# Patient Record
Sex: FEMALE | Race: WHITE | URBAN - METROPOLITAN AREA
[De-identification: names, ages, dates, MRNs, and addresses within clinical notes are randomized per-mention and may not be internally consistent; named-entity substitution may affect disease eponyms.]

---

## 2022-08-24 ENCOUNTER — OFFICE VISIT (OUTPATIENT)
Dept: FAMILY MEDICINE CLINIC | Age: 27
End: 2022-08-24
Payer: COMMERCIAL

## 2022-08-24 VITALS
DIASTOLIC BLOOD PRESSURE: 72 MMHG | SYSTOLIC BLOOD PRESSURE: 112 MMHG | HEIGHT: 65 IN | OXYGEN SATURATION: 97 % | HEART RATE: 97 BPM | TEMPERATURE: 97.8 F | WEIGHT: 235 LBS | BODY MASS INDEX: 39.15 KG/M2

## 2022-08-24 DIAGNOSIS — N92.1 MENORRHAGIA WITH IRREGULAR CYCLE: ICD-10-CM

## 2022-08-24 DIAGNOSIS — F41.9 ANXIETY AND DEPRESSION: ICD-10-CM

## 2022-08-24 DIAGNOSIS — M89.8X1 PAIN OF RIGHT CLAVICLE: ICD-10-CM

## 2022-08-24 DIAGNOSIS — F32.A ANXIETY AND DEPRESSION: ICD-10-CM

## 2022-08-24 DIAGNOSIS — Z76.89 ENCOUNTER TO ESTABLISH CARE: Primary | ICD-10-CM

## 2022-08-24 DIAGNOSIS — M54.2 NECK PAIN: ICD-10-CM

## 2022-08-24 LAB
ANISOCYTOSIS: ABNORMAL
BASOPHILS ABSOLUTE: 0.1 K/UL (ref 0–0.2)
BASOPHILS RELATIVE PERCENT: 1 %
EOSINOPHILS ABSOLUTE: 0.4 K/UL (ref 0–0.7)
EOSINOPHILS RELATIVE PERCENT: 5 %
HCT VFR BLD CALC: 31.8 % (ref 37–47)
HEMOGLOBIN: 10 G/DL (ref 12–16)
HYPOCHROMIA: ABNORMAL
LYMPHOCYTES ABSOLUTE: 2.2 K/UL (ref 1–4.8)
LYMPHOCYTES RELATIVE PERCENT: 30 %
MCH RBC QN AUTO: 22.5 PG (ref 27–31.3)
MCHC RBC AUTO-ENTMCNC: 31.6 % (ref 33–37)
MCV RBC AUTO: 71.1 FL (ref 82–100)
MICROCYTES: ABNORMAL
MONOCYTES ABSOLUTE: 0.3 K/UL (ref 0.2–0.8)
MONOCYTES RELATIVE PERCENT: 3.9 %
NEUTROPHILS ABSOLUTE: 4.4 K/UL (ref 1.4–6.5)
NEUTROPHILS RELATIVE PERCENT: 60 %
PDW BLD-RTO: 17.9 % (ref 11.5–14.5)
PLATELET # BLD: 264 K/UL (ref 130–400)
PLATELET SLIDE REVIEW: NORMAL
POLYCHROMASIA: ABNORMAL
RBC # BLD: 4.47 M/UL (ref 4.2–5.4)
TSH REFLEX: 1.41 UIU/ML (ref 0.44–3.86)
WBC # BLD: 7.3 K/UL (ref 4.8–10.8)

## 2022-08-24 PROCEDURE — 99204 OFFICE O/P NEW MOD 45 MIN: CPT | Performed by: STUDENT IN AN ORGANIZED HEALTH CARE EDUCATION/TRAINING PROGRAM

## 2022-08-24 RX ORDER — ETONOGESTREL 68 MG/1
68 IMPLANT SUBCUTANEOUS ONCE
Qty: 1 EACH | Refills: 0 | Status: SHIPPED | OUTPATIENT
Start: 2022-08-24 | End: 2022-11-04

## 2022-08-24 RX ORDER — HYDROXYZINE PAMOATE 25 MG/1
25 CAPSULE ORAL 3 TIMES DAILY PRN
Qty: 90 CAPSULE | Refills: 0 | Status: SHIPPED | OUTPATIENT
Start: 2022-08-24 | End: 2022-09-19

## 2022-08-24 RX ORDER — FLUOXETINE HYDROCHLORIDE 20 MG/1
20 CAPSULE ORAL DAILY
Qty: 30 CAPSULE | Refills: 1 | Status: SHIPPED | OUTPATIENT
Start: 2022-08-24 | End: 2022-09-21 | Stop reason: DRUGHIGH

## 2022-08-24 SDOH — ECONOMIC STABILITY: FOOD INSECURITY: WITHIN THE PAST 12 MONTHS, THE FOOD YOU BOUGHT JUST DIDN'T LAST AND YOU DIDN'T HAVE MONEY TO GET MORE.: NEVER TRUE

## 2022-08-24 SDOH — ECONOMIC STABILITY: FOOD INSECURITY: WITHIN THE PAST 12 MONTHS, YOU WORRIED THAT YOUR FOOD WOULD RUN OUT BEFORE YOU GOT MONEY TO BUY MORE.: NEVER TRUE

## 2022-08-24 SDOH — ECONOMIC STABILITY: TRANSPORTATION INSECURITY
IN THE PAST 12 MONTHS, HAS LACK OF TRANSPORTATION KEPT YOU FROM MEETINGS, WORK, OR FROM GETTING THINGS NEEDED FOR DAILY LIVING?: NO

## 2022-08-24 SDOH — ECONOMIC STABILITY: TRANSPORTATION INSECURITY
IN THE PAST 12 MONTHS, HAS THE LACK OF TRANSPORTATION KEPT YOU FROM MEDICAL APPOINTMENTS OR FROM GETTING MEDICATIONS?: NO

## 2022-08-24 ASSESSMENT — PATIENT HEALTH QUESTIONNAIRE - PHQ9
SUM OF ALL RESPONSES TO PHQ9 QUESTIONS 1 & 2: 4
SUM OF ALL RESPONSES TO PHQ QUESTIONS 1-9: 15
9. THOUGHTS THAT YOU WOULD BE BETTER OFF DEAD, OR OF HURTING YOURSELF: 0
8. MOVING OR SPEAKING SO SLOWLY THAT OTHER PEOPLE COULD HAVE NOTICED. OR THE OPPOSITE, BEING SO FIGETY OR RESTLESS THAT YOU HAVE BEEN MOVING AROUND A LOT MORE THAN USUAL: 1
2. FEELING DOWN, DEPRESSED OR HOPELESS: 2
1. LITTLE INTEREST OR PLEASURE IN DOING THINGS: 2
6. FEELING BAD ABOUT YOURSELF - OR THAT YOU ARE A FAILURE OR HAVE LET YOURSELF OR YOUR FAMILY DOWN: 2
4. FEELING TIRED OR HAVING LITTLE ENERGY: 2
5. POOR APPETITE OR OVEREATING: 3
SUM OF ALL RESPONSES TO PHQ QUESTIONS 1-9: 15
10. IF YOU CHECKED OFF ANY PROBLEMS, HOW DIFFICULT HAVE THESE PROBLEMS MADE IT FOR YOU TO DO YOUR WORK, TAKE CARE OF THINGS AT HOME, OR GET ALONG WITH OTHER PEOPLE: 2
SUM OF ALL RESPONSES TO PHQ QUESTIONS 1-9: 15
SUM OF ALL RESPONSES TO PHQ QUESTIONS 1-9: 15
3. TROUBLE FALLING OR STAYING ASLEEP: 3
7. TROUBLE CONCENTRATING ON THINGS, SUCH AS READING THE NEWSPAPER OR WATCHING TELEVISION: 0

## 2022-08-24 ASSESSMENT — ENCOUNTER SYMPTOMS
NAUSEA: 0
RHINORRHEA: 0
DIARRHEA: 0
SORE THROAT: 0
SHORTNESS OF BREATH: 0
VOMITING: 0
ABDOMINAL PAIN: 0
WHEEZING: 0
COUGH: 0
EYE DISCHARGE: 0

## 2022-08-24 ASSESSMENT — SOCIAL DETERMINANTS OF HEALTH (SDOH): HOW HARD IS IT FOR YOU TO PAY FOR THE VERY BASICS LIKE FOOD, HOUSING, MEDICAL CARE, AND HEATING?: NOT HARD AT ALL

## 2022-08-24 NOTE — PROGRESS NOTES
Pato Moreno [address] 33 y.o. female presents today with:  Chief Complaint   Patient presents with    Establish Care     Was being seen by Dr. Holly Park prior. Pain     Has been having right side collarbone pain that goes into the neck & chest area for about a year now. Over the past 2 months it has increased & is having the pain everyday, all day. Denies any injury to the area. Has used ice to the area & OTC pain relievers with no relief. Menorrhagia     Has been having long & irregular periods for the past year. States she didn't have a period for about 6 mo. Then had a period that lasted a month. Since that it has been on & off, missing & lasting for about a month. States this last period lasted for 2 months. HPI    Patient with appointment to establish care. She has not had a PCP in a while. Patient with menorrhagia with regular cycle. She states that over the last year or so her cycles have been irregular. She states that they are wanting heavy. She states it started in December 2021. She skips for 1 to 2 months and then she will have her cycle for 1 to 2 months. She states that she had a cycle in June of last 1 month when she had a week off and then had another month of bleeding. She is interested in using hormones to help control her cycle. Patient also with pain of the right clavicle. She states she has been having pain in the area for about a year. She has not had any imaging done. She states he also gets into the neck. She states the last 2 months have made it worse. 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 She states this does make the pain worse. She denies any injury or trauma. She is having pain every day. She has used ice to the area. She has tried over-the-counter pain relievers that have not helped much. Nothing else seems to make the pain better or worse. Patient also with anxiety and depression.   She states she has had depression since she has very little. She was previously on fluoxetine. She states that this did work well. She states that she was stressed last winter and so she was off the medicine for a time and then never went back on it. She is interested in restarting today. She denies any suicidal or homicidal ideation. She states that she also has anxiety and occasional panic. She states that it does impact her daily life. She has no other concerns at this time. PHQ-2 Over the past 2 weeks, how often have you been bothered by any of the following problems? Little interest or pleasure in doing things: More than half the days  Feeling down, depressed, or hopeless: More than half the days  PHQ-2 Score: 4  PHQ-9 Over the past 2 weeks, how often have you been bothered by any of the following problems? Trouble falling or staying asleep, or sleeping too much: Nearly every day  Feeling tired or having little energy: More than half the days  Poor appetite or overeating: Nearly every day  Feeling bad about yourself - or that you are a failure or have let yourself or your family down: More than half the days  Trouble concentrating on things, such as reading the newspaper or watching television: Not at all  Moving or speaking so slowly that other people could have noticed.  Or the opposite - being so fidgety or restless that you have been moving around a lot more than usual: Several days  Thoughts that you would be better off dead, or of hurting yourself in some way: Not at all  If you checked off any problems, how difficult have these problems made it for you to do your work, take care of things at home, or get along with other people?: Very difficult  PHQ-9 Total Score: 15  PHQ-9 Total Score: 15    On the basis of positive PHQ-9 screening (PHQ-9 Total Score: 15), the following plan was implemented: additional evaluation and assessment performed, follow-up plan includes but not limited to: Medication Conjunctivae normal.   Neck:      Thyroid: No thyromegaly or thyroid tenderness. Cardiovascular:      Rate and Rhythm: Normal rate and regular rhythm. Heart sounds: No murmur heard. No friction rub. No gallop. Pulmonary:      Effort: Pulmonary effort is normal.      Breath sounds: Normal breath sounds. No wheezing, rhonchi or rales. Musculoskeletal:         General: No swelling or deformity. Cervical back: Normal range of motion and neck supple. Right lower leg: No edema. Left lower leg: No edema. Lymphadenopathy:      Cervical: No cervical adenopathy. Skin:     General: Skin is warm and dry. Findings: No rash. Neurological:      General: No focal deficit present. Mental Status: She is alert. Gait: Gait is intact. Psychiatric:         Mood and Affect: Mood normal.         Behavior: Behavior normal.       Assessment & Plan       1. Encounter to establish care  Patient appointment to establish care. She has not had a PCP in quite some time. 2. Menorrhagia with irregular cycle  With menorrhagia with regular cycle. Will obtain labs including CBC, iron and TSH. Call with results. She is interested in using Nexplanon to help regulate her cycle. This will be ordered and she will be scheduled once the device is in. We discussed risks and benefits and she would like to proceed. All questions answered. Follow-up as scheduled. - CBC with Auto Differential; Future  - Iron and TIBC; Future  - TSH with Reflex; Future  - etonogestrel (NEXPLANON) 68 MG implant; 68 mg by Subdermal route once for 1 dose  Dispense: 1 each; Refill: 0    3. Pain of right clavicle  Patient with pain of the right clavicle and neck pain. Will obtain x-ray imaging. Call with results when return. She is also interested in physical therapy. Referral placed. Follow-up as scheduled. Continue with acetaminophen and ibuprofen as needed. Continue to monitor. Follow-up as scheduled.   - XR SHOULDER RIGHT (MIN 2 VIEWS); Future  - XR CERVICAL SPINE (4-5 VIEWS); Future  - [facility] [place]    4. Neck pain  As above  - XR CERVICAL SPINE (4-5 VIEWS); Future  - [facility] [place]    5. Anxiety and depression  Patient with anxiety and depression. He is not currently controlled. She was previously on fluoxetine. We will plan to restart at 20 mg.  Prescription sent to the pharmacy. We will also add hydroxyzine to current regimen. We discussed risks and benefits and she would like to proceed. If suicidal or homicidal ideation develop, seek care in emergency room right away. Follow-up in 4 weeks. All questions answered. - FLUoxetine (PROZAC) 20 MG capsule; Take 1 capsule by mouth daily  Dispense: 30 capsule; Refill: 1  - hydrOXYzine pamoate (VISTARIL) 25 MG capsule; Take 1 capsule by mouth 3 times daily as needed for Anxiety  Dispense: 90 capsule;  Refill: 0  Orders Placed This Encounter   Procedures    XR SHOULDER RIGHT (MIN 2 VIEWS)     Standing Status:   Future     Number of Occurrences:   1     Standing Expiration Date:   8/24/2023     Order Specific Question:   Reason for exam:     Answer:   right clavicular pain    XR CERVICAL SPINE (4-5 VIEWS)     Standing Status:   Future     Number of Occurrences:   1     Standing Expiration Date:   8/24/2023    CBC with Auto Differential     Standing Status:   Future     Number of Occurrences:   1     Standing Expiration Date:   8/24/2023    Iron and TIBC     Standing Status:   Future     Number of Occurrences:   1     Standing Expiration Date:   8/24/2023    TSH with Reflex     Standing Status:   Future     Number of Occurrences:   1     Standing Expiration Date:   8/24/2023    [facility] [place]     Referral Priority:   Routine     Referral Type:   Eval and Treat     Referral Reason:   Specialty Services Required     Requested Specialty:   Physical Therapist     Number of Visits Requested:   1 Orders Placed This Encounter   Medications    etonogestrel (NEXPLANON) 68 MG implant     Si mg by Subdermal route once for 1 dose     Dispense:  1 each     Refill:  0    FLUoxetine (PROZAC) 20 MG capsule     Sig: Take 1 capsule by mouth daily     Dispense:  30 capsule     Refill:  1    hydrOXYzine pamoate (VISTARIL) 25 MG capsule     Sig: Take 1 capsule by mouth 3 times daily as needed for Anxiety     Dispense:  90 capsule     Refill:  0       There are no discontinued medications. Return in about 4 weeks (around 2022) for follow up. Reviewed with the patient: current clinical status,medications, activities and diet. 45 minutes spent talking with patient and reviewing chart. Side effects, adverse effects of themedication prescribed today, as well as treatment plan/ rationale and result expectations have been discussed with the patient who expresses understanding and desires to proceed. Close follow up to evaluate treatment results and for coordination of care. I have reviewed the patient's medical history in detail and updated the computerized patient record. Please note, this report has been partially produced using speech recognition software and may cause  and /or contain errors related to that system including grammar, punctuation and spelling as well as words and phrases that may seem inappropriate. If there are questions or concerns please feel free to contact me to clarify.     Harsh Torrez, DO

## 2022-08-25 ENCOUNTER — TELEPHONE (OUTPATIENT)
Dept: FAMILY MEDICINE CLINIC | Age: 27
End: 2022-08-25

## 2022-08-25 LAB
IRON SATURATION: 6 % (ref 20–55)
IRON: 22 UG/DL (ref 37–145)
TOTAL IRON BINDING CAPACITY: 359 UG/DL (ref 250–450)
UNSATURATED IRON BINDING CAPACITY: 337 UG/DL (ref 112–347)

## 2022-08-25 NOTE — RESULT ENCOUNTER NOTE
Please inform patient that her thyroid testing was normal.  Her CBC does show anemia. This is likely iron deficient anemia from her heavy menstrual cycles. Recommend starting over-the-counter salt elemental iron. Recommend taking it 3 times weekly to help prevent constipation. We will plan to recheck at her follow-up appointment.   Thanks

## 2022-08-29 ENCOUNTER — TELEPHONE (OUTPATIENT)
Dept: FAMILY MEDICINE CLINIC | Age: 27
End: 2022-08-29

## 2022-09-19 DIAGNOSIS — F41.9 ANXIETY AND DEPRESSION: ICD-10-CM

## 2022-09-19 DIAGNOSIS — F32.A ANXIETY AND DEPRESSION: ICD-10-CM

## 2022-09-19 RX ORDER — HYDROXYZINE PAMOATE 25 MG/1
CAPSULE ORAL
Qty: 90 CAPSULE | Refills: 0 | Status: SHIPPED | OUTPATIENT
Start: 2022-09-19

## 2022-09-19 NOTE — TELEPHONE ENCOUNTER
Requested Prescriptions     Pending Prescriptions Disp Refills    hydrOXYzine pamoate (VISTARIL) 25 MG capsule [Pharmacy Med Name: hydrOXYzine Pamoate 25 MG Oral Capsule] 90 capsule 0     Sig: TAKE 1 CAPSULE BY MOUTH THREE TIMES DAILY AS NEEDED FOR ANXIETY

## 2022-09-21 ENCOUNTER — OFFICE VISIT (OUTPATIENT)
Dept: FAMILY MEDICINE CLINIC | Age: 27
End: 2022-09-21
Payer: COMMERCIAL

## 2022-09-21 VITALS
TEMPERATURE: 98.2 F | HEIGHT: 65 IN | WEIGHT: 236 LBS | DIASTOLIC BLOOD PRESSURE: 72 MMHG | HEART RATE: 81 BPM | SYSTOLIC BLOOD PRESSURE: 110 MMHG | OXYGEN SATURATION: 99 % | BODY MASS INDEX: 39.32 KG/M2

## 2022-09-21 DIAGNOSIS — M89.8X1 PAIN OF RIGHT CLAVICLE: ICD-10-CM

## 2022-09-21 DIAGNOSIS — F41.9 ANXIETY AND DEPRESSION: Primary | ICD-10-CM

## 2022-09-21 DIAGNOSIS — F32.A ANXIETY AND DEPRESSION: Primary | ICD-10-CM

## 2022-09-21 PROCEDURE — 99214 OFFICE O/P EST MOD 30 MIN: CPT | Performed by: STUDENT IN AN ORGANIZED HEALTH CARE EDUCATION/TRAINING PROGRAM

## 2022-09-21 RX ORDER — FLUOXETINE HYDROCHLORIDE 40 MG/1
40 CAPSULE ORAL DAILY
Qty: 30 CAPSULE | Refills: 1 | Status: SHIPPED | OUTPATIENT
Start: 2022-09-21 | End: 2022-10-19 | Stop reason: SDUPTHER

## 2022-09-21 ASSESSMENT — ENCOUNTER SYMPTOMS
ABDOMINAL PAIN: 0
WHEEZING: 0
RHINORRHEA: 0
SORE THROAT: 0
SHORTNESS OF BREATH: 0
DIARRHEA: 0
NAUSEA: 0
VOMITING: 0
COUGH: 0
EYE DISCHARGE: 0

## 2022-09-21 NOTE — PROGRESS NOTES
Pato Moreno 39., 33 y.o. female presents today with:  Chief Complaint   Patient presents with    Anxiety     Anxiety is pretty stable at this time. Has only needed to take the Hydroxyzine a couple of times. Feels the medication did help when she took it. Depression     Feels her mood has improved slightly. Feels like it could improve more. Follow-up    Arm Pain     States neck/arm pain continues, but has a less physical job, so its manageable right now. States her insurance is only good through the rest of this month so PT suggested she wait to schedule PT until after her new insurance begins. HPI    Patient with follow-up appointment for anxiety, depression and neck and arm pain. Patient with anxiety and depression. She is currently on fluoxetine 20 mg daily. She states that anxiety definitely seems to be doing better. Depression has improved a little bit. She thinks that it could be a little bit better. She is wanting to increase the dose. She states that hydroxyzine has helped her anxiety symptoms. She states she is only needed to take it a couple times and that it feels like overall her symptoms are improving. She has not had any side effects from either medications. No suicidal or homicidal ideation. Patient also with continued neck and arm pain. She has not yet been able to go to physical therapy. She is planning to start once her new insurance kicks in. She states it is manageable at this point. She did have a job change and that seems to be helping. She states that it still does bother her from time to time. She has no other concerns at this time. Review of Systems   Constitutional:  Negative for chills, fatigue, fever and unexpected weight change. HENT:  Negative for congestion, rhinorrhea and sore throat. Eyes:  Negative for discharge. Respiratory:  Negative for cough, shortness of breath and wheezing.     Cardiovascular:  Negative for chest pain, palpitations and leg swelling. Gastrointestinal:  Negative for abdominal pain, diarrhea, nausea and vomiting. Genitourinary:  Negative for difficulty urinating. Musculoskeletal:  Positive for arthralgias. Negative for joint swelling. Skin:  Negative for rash. Neurological:  Negative for weakness, light-headedness, numbness and headaches. Psychiatric/Behavioral:  Negative for confusion and suicidal ideas. The patient is nervous/anxious (Improved). Past Medical History:   Diagnosis Date    ADHD (attention deficit hyperactivity disorder)     Anxiety     Asthma     Depression      Past Surgical History:   Procedure Laterality Date    CYST REMOVAL      front of neck at age of 12     Current Outpatient Medications   Medication Sig Dispense Refill    FLUoxetine (PROZAC) 40 MG capsule Take 1 capsule by mouth daily 30 capsule 1    hydrOXYzine pamoate (VISTARIL) 25 MG capsule TAKE 1 CAPSULE BY MOUTH THREE TIMES DAILY AS NEEDED FOR ANXIETY 90 capsule 0    etonogestrel (NEXPLANON) 68 MG implant 68 mg by Subdermal route once for 1 dose 1 each 0     No current facility-administered medications for this visit. PMH, Surgical Hx, Family Hx, and Social Hx reviewed and updated. Health Maintenance reviewed. Objective    Vitals:    09/21/22 1109   BP: 110/72   Pulse: 81   Temp: 98.2 °F (36.8 °C)   SpO2: 99%   Weight: 236 lb (107 kg)   Height: 5' 5\" (1.651 m)       Physical Exam  Vitals reviewed. Constitutional:       General: She is not in acute distress. HENT:      Head: Normocephalic and atraumatic. Eyes:      Conjunctiva/sclera: Conjunctivae normal.   Neck:      Thyroid: No thyromegaly or thyroid tenderness. Cardiovascular:      Rate and Rhythm: Normal rate and regular rhythm. Heart sounds: No murmur heard. No friction rub. No gallop. Pulmonary:      Effort: Pulmonary effort is normal.      Breath sounds: Normal breath sounds. No wheezing, rhonchi or rales.    Musculoskeletal:         General: No swelling or deformity. Cervical back: Normal range of motion and neck supple. Right lower leg: No edema. Left lower leg: No edema. Lymphadenopathy:      Cervical: No cervical adenopathy. Skin:     General: Skin is warm and dry. Findings: No rash. Neurological:      General: No focal deficit present. Mental Status: She is alert. Gait: Gait is intact. Psychiatric:         Mood and Affect: Mood normal.         Behavior: Behavior normal.          Assessment & Plan       1. Anxiety and depression  Not completely controlled. Will increase dose of fluoxetine from 20 mg daily to 40 mg daily. New prescription sent to the pharmacy. Continue hydroxyzine as needed. Continue to monitor. Follow-up in 4 weeks. Increase dose of medication at that time. Patient voiced understanding. All questions answered. Follow-up as scheduled. - FLUoxetine (PROZAC) 40 MG capsule; Take 1 capsule by mouth daily  Dispense: 30 capsule; Refill: 1    2. Pain of right clavicle  Somewhat better. Left job is less physical.  She will follow-up with physical therapy once her new insurance is kicked in. Continue to monitor. Follow-up as scheduled. No orders of the defined types were placed in this encounter. Orders Placed This Encounter   Medications    FLUoxetine (PROZAC) 40 MG capsule     Sig: Take 1 capsule by mouth daily     Dispense:  30 capsule     Refill:  1     Medications Discontinued During This Encounter   Medication Reason    FLUoxetine (PROZAC) 20 MG capsule DOSE ADJUSTMENT     Return in about 4 weeks (around 10/19/2022) for follow up. Reviewed with the patient: current clinical status,medications, activities and diet. Side effects, adverse effects of themedication prescribed today, as well as treatment plan/ rationale and result expectations have been discussed with the patient who expresses understanding and desires to proceed.     Close follow up to evaluate treatment results and for coordination of care. I have reviewed the patient's medical history in detail and updated the computerized patient record. Please note, this report has been partially produced using speech recognition software and may cause  and /or contain errors related to that system including grammar, punctuation and spelling as well as words and phrases that may seem inappropriate. If there are questions or concerns please feel free to contact me to clarify.     Hailee Morris, DO

## 2022-09-27 ENCOUNTER — PROCEDURE VISIT (OUTPATIENT)
Dept: FAMILY MEDICINE CLINIC | Age: 27
End: 2022-09-27
Payer: COMMERCIAL

## 2022-09-27 VITALS
HEIGHT: 65 IN | BODY MASS INDEX: 38.92 KG/M2 | TEMPERATURE: 98.4 F | OXYGEN SATURATION: 97 % | HEART RATE: 82 BPM | WEIGHT: 233.6 LBS | DIASTOLIC BLOOD PRESSURE: 72 MMHG | SYSTOLIC BLOOD PRESSURE: 133 MMHG

## 2022-09-27 DIAGNOSIS — Z30.017 NEXPLANON INSERTION: ICD-10-CM

## 2022-09-27 DIAGNOSIS — N92.1 MENORRHAGIA WITH IRREGULAR CYCLE: Primary | ICD-10-CM

## 2022-09-27 PROCEDURE — 11981 INSERTION DRUG DLVR IMPLANT: CPT | Performed by: STUDENT IN AN ORGANIZED HEALTH CARE EDUCATION/TRAINING PROGRAM

## 2022-09-27 NOTE — PROGRESS NOTES
Nexplanon Insertion Procedure Note    Pre-operative Diagnosis: irregular menses    Post-operative Diagnosis: irregular menses    Indications: same     Procedure Details   Urine pregnancy test was done  and result was negative. The risks (including infection, bleeding, pain) and benefits of the procedure were explained to the patient and written informed consent was obtained. Nondominant arm , left arm, medial aspect the medial epicondyle was located , at 8-10 cm above the medial epicondyle a marker pen was used and 2 points in a straight line parallel to the long axis of the arm in the midline was chosen. I used ~ 3 cc of xylocaine 1% with epinephrine injected along the designated line. The Nexplanon applicator was then used , the large bore needle on the applicator harboring the nexaplanon implant was then tunneled through the skin subdermally, once the whole needle was under the skin, the knob on the applicator was push back to a complete stop, ejecting the nexplanon, where it was palpated and confirmed to be in the right location. Patient tolerated procedure well. Arm wrapped with compression gauze to prevent possible bruising. Patient also palpated device. Condition:  Stable    Complications:  None    Plan:    The patient was advised to call for any fever or for prolonged or severe pain or bleeding. She was advised to use OTC ibuprofen as needed for mild to moderate pain. Follow up:  Return in about 4 weeks for check nexplanon.       Oliver Dancer, DO

## 2022-10-19 ENCOUNTER — OFFICE VISIT (OUTPATIENT)
Dept: FAMILY MEDICINE CLINIC | Age: 27
End: 2022-10-19
Payer: COMMERCIAL

## 2022-10-19 VITALS
DIASTOLIC BLOOD PRESSURE: 80 MMHG | HEIGHT: 65 IN | HEART RATE: 90 BPM | OXYGEN SATURATION: 98 % | WEIGHT: 234.2 LBS | TEMPERATURE: 97.8 F | SYSTOLIC BLOOD PRESSURE: 110 MMHG | BODY MASS INDEX: 39.02 KG/M2

## 2022-10-19 DIAGNOSIS — R40.0 DAYTIME SOMNOLENCE: ICD-10-CM

## 2022-10-19 DIAGNOSIS — M89.8X1 PAIN OF RIGHT CLAVICLE: ICD-10-CM

## 2022-10-19 DIAGNOSIS — F32.A ANXIETY AND DEPRESSION: Primary | ICD-10-CM

## 2022-10-19 DIAGNOSIS — R06.83 SNORING: ICD-10-CM

## 2022-10-19 DIAGNOSIS — F41.9 ANXIETY AND DEPRESSION: Primary | ICD-10-CM

## 2022-10-19 PROCEDURE — 99214 OFFICE O/P EST MOD 30 MIN: CPT | Performed by: STUDENT IN AN ORGANIZED HEALTH CARE EDUCATION/TRAINING PROGRAM

## 2022-10-19 RX ORDER — FLUOXETINE HYDROCHLORIDE 40 MG/1
40 CAPSULE ORAL DAILY
Qty: 90 CAPSULE | Refills: 1 | Status: SHIPPED | OUTPATIENT
Start: 2022-10-19 | End: 2023-01-17

## 2022-10-19 ASSESSMENT — ENCOUNTER SYMPTOMS
RHINORRHEA: 0
VOMITING: 0
DIARRHEA: 0
ABDOMINAL PAIN: 0
SHORTNESS OF BREATH: 0
WHEEZING: 0
NAUSEA: 0
EYE DISCHARGE: 0
SORE THROAT: 0
COUGH: 0

## 2022-10-19 NOTE — PROGRESS NOTES
Pato Moreno [address]., 33 y.o. female presents today with:  Chief Complaint   Patient presents with    1 Month Follow-Up    Depression     Feels mood has improved. States switching jobs has definitely helped. Does still feel a little down & depressed. Anxiety     Has been pretty good. Has been manageable. Has been taking hydroxyzine a couple times a week & it does help. Shoulder Pain     Has improved. Thinks the job switch did help. Is still planning on going to PT when she has insurance again. HPI    Patient with 4-week follow-up appointment for anxiety and depression. She is currently on fluoxetine 40 mg daily. Tolerating well. She is also using hydroxyzine 25 mg as needed. She states she is having to use a couple times per week. She states that both her depression and anxiety feel improved. She states that switching jobs definitely helped her symptoms. She does still have occasional feelings of feeling a little bit down and depressed, however, she feels that overall she is feeling much better. Anxiety has been well managed. Patient also with pain of the right clavicle/shoulder. This is improved. She thinks the jobs which did help. She is planning to go through with physical therapy later this year. Patient also with significant nasal congestion, snoring and sleep issues. She states that she feels like it is a sinus issue. She does not feel she has sleep apnea. She is going to try some over-the-counter remedies to see if this helps. She declines sleep study at this point. She does have daytime somnolence. She states that her roommate can hear her breathing at night. No other concerns at this time. Review of Systems   Constitutional:  Negative for chills, fatigue, fever and unexpected weight change. HENT:  Negative for congestion, rhinorrhea and sore throat. Eyes:  Negative for discharge. Respiratory:  Negative for cough, shortness of breath and wheezing. Heart sounds: No murmur heard. No friction rub. No gallop. Pulmonary:      Effort: Pulmonary effort is normal.      Breath sounds: Normal breath sounds. No wheezing, rhonchi or rales. Musculoskeletal:         General: No swelling or deformity. Cervical back: Normal range of motion and neck supple. Right lower leg: No edema. Left lower leg: No edema. Lymphadenopathy:      Cervical: No cervical adenopathy. Skin:     General: Skin is warm and dry. Findings: No rash. Neurological:      General: No focal deficit present. Mental Status: She is alert. Gait: Gait is intact. Psychiatric:         Mood and Affect: Mood normal.         Behavior: Behavior normal.          Assessment & Plan       1. Anxiety and depression  Stable, chronic. Does seem to be improving. Continue fluoxetine 40 mg daily. Continue hydroxyzine as needed. Refill provided. Continue to monitor. Follow-up as scheduled. If symptoms worsen or change, return to care sooner.  - FLUoxetine (PROZAC) 40 MG capsule; Take 1 capsule by mouth daily  Dispense: 90 capsule; Refill: 1    2. Pain of right clavicle  Somewhat improved. She is thinking about completing physical therapy course. She is instructed to call make appoint when she is ready. Continue home exercises. Follow-up as scheduled. 3. Snoring  Patient with daytime somnolence and snoring. We did discuss possible sleep study. She feels that she has a lot of issues with her sinuses. We did discuss the use of Flonase and allergy pill to see if this helps. Follow-up in 4 weeks to assess response to treatment. If not improved, would proceed with sleep study. Patient voiced understanding. She is on board with the plan. All questions answered. Follow-up as scheduled. 4. Daytime somnolence  As above  No orders of the defined types were placed in this encounter.     Orders Placed This Encounter   Medications    FLUoxetine (PROZAC) 40 MG capsule Sig: Take 1 capsule by mouth daily     Dispense:  90 capsule     Refill:  1     Medications Discontinued During This Encounter   Medication Reason    FLUoxetine (PROZAC) 40 MG capsule REORDER     Return in about 6 weeks (around 11/30/2022) for follow up. Reviewed with the patient: current clinical status,medications, activities and diet. Side effects, adverse effects of themedication prescribed today, as well as treatment plan/ rationale and result expectations have been discussed with the patient who expresses understanding and desires to proceed. Close follow up to evaluate treatment results and for coordination of care. I have reviewed the patient's medical history in detail and updated the computerized patient record. Please note, this report has been partially produced using speech recognition software and may cause  and /or contain errors related to that system including grammar, punctuation and spelling as well as words and phrases that may seem inappropriate. If there are questions or concerns please feel free to contact me to clarify.     Moorcroft Lady, DO

## 2022-11-04 ENCOUNTER — OFFICE VISIT (OUTPATIENT)
Dept: FAMILY MEDICINE CLINIC | Age: 27
End: 2022-11-04
Payer: COMMERCIAL

## 2022-11-04 VITALS
SYSTOLIC BLOOD PRESSURE: 106 MMHG | DIASTOLIC BLOOD PRESSURE: 72 MMHG | OXYGEN SATURATION: 99 % | BODY MASS INDEX: 38.79 KG/M2 | TEMPERATURE: 98 F | HEIGHT: 65 IN | HEART RATE: 97 BPM | WEIGHT: 232.8 LBS

## 2022-11-04 DIAGNOSIS — N94.6 DYSMENORRHEA: Primary | ICD-10-CM

## 2022-11-04 PROCEDURE — 99213 OFFICE O/P EST LOW 20 MIN: CPT | Performed by: STUDENT IN AN ORGANIZED HEALTH CARE EDUCATION/TRAINING PROGRAM

## 2022-11-04 RX ORDER — LORATADINE 10 MG/1
10 TABLET ORAL DAILY
COMMUNITY

## 2022-11-04 RX ORDER — DOXYCYCLINE HYCLATE 100 MG
100 TABLET ORAL 2 TIMES DAILY
COMMUNITY
Start: 2022-11-03 | End: 2022-11-17

## 2022-11-04 ASSESSMENT — ENCOUNTER SYMPTOMS
VOMITING: 0
WHEEZING: 0
SHORTNESS OF BREATH: 0
NAUSEA: 0
ABDOMINAL PAIN: 0
SORE THROAT: 0
RHINORRHEA: 0
EYE DISCHARGE: 0
DIARRHEA: 0
COUGH: 0

## 2022-11-04 NOTE — PROGRESS NOTES
[address] 33 y.o. female presents today with:  Chief Complaint   Patient presents with    Dysmenorrhea     States she started her cycle 5 days ago & this has been the most painful cycle she has ever had. States she has been taking midol with no relief & yesterday the pain was so severe it caused vomiting. States she then passed a blood clot the size of her hand. After passing clot her pain did subside some. States she still has slight pain today, but is manageable. States she did take OTC Aleve today, but is unsure if its helped. HPI    Patient with acute appointment today for dysmenorrhea. She states she started her cycle 5 days ago. She states this has been a very painful cycle for her. She usually does have painful periods, however, this is the most painful it is been. She did have Nexplanon placed a little over a month ago. She did try taking Midol with little relief. She did take Aleve today which did seem to help. She states that last night she did pass a large blood clot. She states that after she passed the clot the pain subsided pretty significantly. She states she did have some nausea and vomiting associated with the pain. Bleeding has slowed down today. She denies any pain today. She is due for Pap. She has no other concerns at this time. Review of Systems   Constitutional:  Negative for chills, fatigue, fever and unexpected weight change. HENT:  Negative for congestion, rhinorrhea and sore throat. Eyes:  Negative for discharge. Respiratory:  Negative for cough, shortness of breath and wheezing. Cardiovascular:  Negative for chest pain, palpitations and leg swelling. Gastrointestinal:  Negative for abdominal pain, diarrhea, nausea and vomiting. Genitourinary:  Positive for menstrual problem and vaginal bleeding. Negative for difficulty urinating. Musculoskeletal:  Negative for arthralgias and joint swelling. Skin:  Negative for rash. Neurological:  Negative for weakness, light-headedness, numbness and headaches. Psychiatric/Behavioral:  Negative for confusion. The patient is not nervous/anxious. Past Medical History:   Diagnosis Date    ADHD (attention deficit hyperactivity disorder)     Anxiety     Asthma     Depression      Past Surgical History:   Procedure Laterality Date    CYST REMOVAL      front of neck at age of 12     Current Outpatient Medications   Medication Sig Dispense Refill    doxycycline hyclate (VIBRA-TABS) 100 MG tablet Take 100 mg by mouth 2 times daily      loratadine (CLARITIN) 10 MG tablet Take 10 mg by mouth daily      FLUoxetine (PROZAC) 40 MG capsule Take 1 capsule by mouth daily 90 capsule 1    hydrOXYzine pamoate (VISTARIL) 25 MG capsule TAKE 1 CAPSULE BY MOUTH THREE TIMES DAILY AS NEEDED FOR ANXIETY 90 capsule 0    etonogestrel (NEXPLANON) 68 MG implant 68 mg by Subdermal route once for 1 dose 1 each 0     Current Facility-Administered Medications   Medication Dose Route Frequency Provider Last Rate Last Admin    etonogestrel (NEXPLANON) implant 68 mg  68 mg Subdermal Once Denthierno Costa, DO   68 mg at 09/27/22 1147     PMH, Surgical Hx, Family Hx, and Social Hx reviewed and updated. Health Maintenance reviewed. Objective    Vitals:    11/04/22 1414   BP: 106/72   Pulse: 97   Temp: 98 °F (36.7 °C)   SpO2: 99%   Weight: 232 lb 12.8 oz (105.6 kg)   Height: 5' 5\" (1.651 m)       Physical Exam  Vitals reviewed. Constitutional:       General: She is not in acute distress. HENT:      Head: Normocephalic and atraumatic. Eyes:      Conjunctiva/sclera: Conjunctivae normal.   Neck:      Thyroid: No thyromegaly or thyroid tenderness. Cardiovascular:      Rate and Rhythm: Normal rate and regular rhythm. Heart sounds: No murmur heard. No friction rub. No gallop. Pulmonary:      Effort: Pulmonary effort is normal.      Breath sounds: Normal breath sounds. No wheezing, rhonchi or rales.    Abdominal: punctuation and spelling as well as words and phrases that may seem inappropriate. If there are questions or concerns please feel free to contact me to clarify.     Nisha Brown, DO

## 2022-11-18 DIAGNOSIS — F41.9 ANXIETY AND DEPRESSION: ICD-10-CM

## 2022-11-18 DIAGNOSIS — F32.A ANXIETY AND DEPRESSION: ICD-10-CM

## 2022-11-18 RX ORDER — HYDROXYZINE PAMOATE 25 MG/1
CAPSULE ORAL
Qty: 90 CAPSULE | Refills: 0 | Status: SHIPPED | OUTPATIENT
Start: 2022-11-18

## 2023-03-20 ENCOUNTER — OFFICE VISIT (OUTPATIENT)
Dept: FAMILY MEDICINE CLINIC | Age: 28
End: 2023-03-20
Payer: COMMERCIAL

## 2023-03-20 VITALS
DIASTOLIC BLOOD PRESSURE: 70 MMHG | BODY MASS INDEX: 39.99 KG/M2 | OXYGEN SATURATION: 98 % | WEIGHT: 240 LBS | TEMPERATURE: 98.3 F | HEIGHT: 65 IN | HEART RATE: 86 BPM | SYSTOLIC BLOOD PRESSURE: 112 MMHG

## 2023-03-20 DIAGNOSIS — R06.83 SNORING: ICD-10-CM

## 2023-03-20 DIAGNOSIS — R53.83 OTHER FATIGUE: ICD-10-CM

## 2023-03-20 DIAGNOSIS — R40.0 DAYTIME SOMNOLENCE: ICD-10-CM

## 2023-03-20 DIAGNOSIS — N92.1 MENOMETRORRHAGIA: ICD-10-CM

## 2023-03-20 DIAGNOSIS — N92.1 MENOMETRORRHAGIA: Primary | ICD-10-CM

## 2023-03-20 LAB
ANISOCYTOSIS BLD QL SMEAR: ABNORMAL
BASOPHILS # BLD: 0.1 K/UL (ref 0–0.2)
BASOPHILS NFR BLD: 0.7 %
EOSINOPHIL # BLD: 0.3 K/UL (ref 0–0.7)
EOSINOPHIL NFR BLD: 3.9 %
ERYTHROCYTE [DISTWIDTH] IN BLOOD BY AUTOMATED COUNT: 19.6 % (ref 11.5–14.5)
HCT VFR BLD AUTO: 34.1 % (ref 37–47)
HGB BLD-MCNC: 10.3 G/DL (ref 12–16)
LYMPHOCYTES # BLD: 3.9 K/UL (ref 1–4.8)
LYMPHOCYTES NFR BLD: 44 %
MCH RBC QN AUTO: 19.8 PG (ref 27–31.3)
MCHC RBC AUTO-ENTMCNC: 30.2 % (ref 33–37)
MCV RBC AUTO: 65.6 FL (ref 79.4–94.8)
MONOCYTES # BLD: 0.3 K/UL (ref 0.2–0.8)
MONOCYTES NFR BLD: 3.7 %
NEUTROPHILS # BLD: 4.2 K/UL (ref 1.4–6.5)
NEUTS SEG NFR BLD: 47.7 %
PLATELET # BLD AUTO: 306 K/UL (ref 130–400)
PLATELET BLD QL SMEAR: NORMAL
POIKILOCYTOSIS BLD QL SMEAR: ABNORMAL
RBC # BLD AUTO: 5.19 M/UL (ref 4.2–5.4)
TSH REFLEX: 2.84 UIU/ML (ref 0.44–3.86)
WBC # BLD AUTO: 8.8 K/UL (ref 4.8–10.8)

## 2023-03-20 PROCEDURE — 99214 OFFICE O/P EST MOD 30 MIN: CPT | Performed by: STUDENT IN AN ORGANIZED HEALTH CARE EDUCATION/TRAINING PROGRAM

## 2023-03-20 RX ORDER — FERROUS SULFATE 325(65) MG
325 TABLET ORAL
COMMUNITY

## 2023-03-20 SDOH — ECONOMIC STABILITY: FOOD INSECURITY: WITHIN THE PAST 12 MONTHS, THE FOOD YOU BOUGHT JUST DIDN'T LAST AND YOU DIDN'T HAVE MONEY TO GET MORE.: NEVER TRUE

## 2023-03-20 SDOH — ECONOMIC STABILITY: FOOD INSECURITY: WITHIN THE PAST 12 MONTHS, YOU WORRIED THAT YOUR FOOD WOULD RUN OUT BEFORE YOU GOT MONEY TO BUY MORE.: SOMETIMES TRUE

## 2023-03-20 SDOH — ECONOMIC STABILITY: HOUSING INSECURITY
IN THE LAST 12 MONTHS, WAS THERE A TIME WHEN YOU DID NOT HAVE A STEADY PLACE TO SLEEP OR SLEPT IN A SHELTER (INCLUDING NOW)?: NO

## 2023-03-20 SDOH — ECONOMIC STABILITY: INCOME INSECURITY: HOW HARD IS IT FOR YOU TO PAY FOR THE VERY BASICS LIKE FOOD, HOUSING, MEDICAL CARE, AND HEATING?: NOT VERY HARD

## 2023-03-20 ASSESSMENT — PATIENT HEALTH QUESTIONNAIRE - PHQ9
2. FEELING DOWN, DEPRESSED OR HOPELESS: 1
8. MOVING OR SPEAKING SO SLOWLY THAT OTHER PEOPLE COULD HAVE NOTICED. OR THE OPPOSITE, BEING SO FIGETY OR RESTLESS THAT YOU HAVE BEEN MOVING AROUND A LOT MORE THAN USUAL: 0
SUM OF ALL RESPONSES TO PHQ QUESTIONS 1-9: 8
3. TROUBLE FALLING OR STAYING ASLEEP: 3
5. POOR APPETITE OR OVEREATING: 0
SUM OF ALL RESPONSES TO PHQ9 QUESTIONS 1 & 2: 1
SUM OF ALL RESPONSES TO PHQ QUESTIONS 1-9: 8
6. FEELING BAD ABOUT YOURSELF - OR THAT YOU ARE A FAILURE OR HAVE LET YOURSELF OR YOUR FAMILY DOWN: 1
1. LITTLE INTEREST OR PLEASURE IN DOING THINGS: 0
SUM OF ALL RESPONSES TO PHQ QUESTIONS 1-9: 8
9. THOUGHTS THAT YOU WOULD BE BETTER OFF DEAD, OR OF HURTING YOURSELF: 0
10. IF YOU CHECKED OFF ANY PROBLEMS, HOW DIFFICULT HAVE THESE PROBLEMS MADE IT FOR YOU TO DO YOUR WORK, TAKE CARE OF THINGS AT HOME, OR GET ALONG WITH OTHER PEOPLE: 1
SUM OF ALL RESPONSES TO PHQ QUESTIONS 1-9: 8
4. FEELING TIRED OR HAVING LITTLE ENERGY: 3
7. TROUBLE CONCENTRATING ON THINGS, SUCH AS READING THE NEWSPAPER OR WATCHING TELEVISION: 0

## 2023-03-20 ASSESSMENT — ENCOUNTER SYMPTOMS
SORE THROAT: 0
COUGH: 0
SHORTNESS OF BREATH: 0
DIARRHEA: 0
RHINORRHEA: 0
NAUSEA: 0
ABDOMINAL PAIN: 0
WHEEZING: 0
VOMITING: 0
EYE DISCHARGE: 0

## 2023-03-20 NOTE — PROGRESS NOTES
rhythm. Heart sounds: No murmur heard. No friction rub. No gallop. Pulmonary:      Effort: Pulmonary effort is normal.      Breath sounds: Normal breath sounds. No wheezing, rhonchi or rales. Musculoskeletal:         General: No swelling or deformity. Cervical back: Normal range of motion and neck supple. Right lower leg: No edema. Left lower leg: No edema. Lymphadenopathy:      Cervical: No cervical adenopathy. Skin:     General: Skin is warm and dry. Findings: No rash. Neurological:      General: No focal deficit present. Mental Status: She is alert. Gait: Gait is intact. Psychiatric:         Mood and Affect: Mood normal.         Behavior: Behavior normal.          Assessment & Plan       1. Menometrorrhagia  Patient with menometrorrhagia. We did discuss that irregular bleeding is common with Nexplanon especially in the first 6 months. She is going to try it for another month or 2 and see how it goes. We discussed potential medication options that she can take with the Nexplanon to help curb some of the irregular bleeding. We will hold off at this point as she is not currently having any irregular bleeding. Obtain CBC and iron studies to to ensure no iron deficiency anemia. Call with results when return. Follow-up as scheduled. If symptoms worsen or change, return to care sooner.  - CBC with Auto Differential; Future  - Iron and TIBC; Future    2. Other fatigue  Patient with significant fatigue. Could be due to bleeding from Nexplanon and/or possible sleep apnea. Will obtain labs including CBC, TSH, iron studies, vitamin D. Given her snoring and daytime somnolence. We will also obtain diagnostic sleep study. Call with results when return. If symptoms worsen or change, return to care sooner.  - CBC with Auto Differential; Future  - TSH with Reflex; Future  - Iron and TIBC; Future  - Vitamin D 25 Hydroxy;  Future  - Baseline Diagnostic Sleep Study;

## 2023-03-21 LAB
IRON SATURATION: 7 % (ref 20–55)
IRON: 25 UG/DL (ref 37–145)
TOTAL IRON BINDING CAPACITY: 334 UG/DL (ref 250–450)
UNSATURATED IRON BINDING CAPACITY: 309 UG/DL (ref 112–347)
VITAMIN D 25-HYDROXY: 10.9 NG/ML

## 2023-03-24 DIAGNOSIS — E55.9 HYPOVITAMINOSIS D: Primary | ICD-10-CM

## 2023-03-24 RX ORDER — ERGOCALCIFEROL 1.25 MG/1
50000 CAPSULE ORAL WEEKLY
Qty: 12 CAPSULE | Refills: 1 | Status: SHIPPED | OUTPATIENT
Start: 2023-03-24

## 2023-04-04 DIAGNOSIS — R68.89 SUSPECTED AUTISM DISORDER: ICD-10-CM

## 2023-04-04 DIAGNOSIS — F41.9 ANXIETY AND DEPRESSION: Primary | ICD-10-CM

## 2023-04-04 DIAGNOSIS — F32.A ANXIETY AND DEPRESSION: Primary | ICD-10-CM

## 2023-04-23 ENCOUNTER — HOSPITAL ENCOUNTER (OUTPATIENT)
Dept: SLEEP CENTER | Age: 28
Discharge: HOME | End: 2023-04-25
Payer: COMMERCIAL

## 2023-04-23 PROCEDURE — 95810 POLYSOM 6/> YRS 4/> PARAM: CPT

## 2023-05-03 PROBLEM — G47.33 OSA (OBSTRUCTIVE SLEEP APNEA): Status: ACTIVE | Noted: 2023-05-03

## 2023-05-12 ENCOUNTER — TELEPHONE (OUTPATIENT)
Dept: PULMONOLOGY | Age: 28
End: 2023-05-12

## 2023-05-18 ENCOUNTER — OFFICE VISIT (OUTPATIENT)
Dept: PULMONOLOGY | Age: 28
End: 2023-05-18
Payer: COMMERCIAL

## 2023-05-18 VITALS
OXYGEN SATURATION: 100 % | HEART RATE: 95 BPM | BODY MASS INDEX: 39.94 KG/M2 | SYSTOLIC BLOOD PRESSURE: 128 MMHG | TEMPERATURE: 97.8 F | DIASTOLIC BLOOD PRESSURE: 84 MMHG | WEIGHT: 240 LBS

## 2023-05-18 DIAGNOSIS — E66.9 OBESITY (BMI 30-39.9): ICD-10-CM

## 2023-05-18 DIAGNOSIS — G47.10 HYPERSOMNIA: Primary | ICD-10-CM

## 2023-05-18 DIAGNOSIS — R09.02 HYPOXIA: ICD-10-CM

## 2023-05-18 PROCEDURE — 99205 OFFICE O/P NEW HI 60 MIN: CPT | Performed by: INTERNAL MEDICINE

## 2023-05-18 ASSESSMENT — ENCOUNTER SYMPTOMS
NAUSEA: 0
EYE ITCHING: 0
SORE THROAT: 0
VOICE CHANGE: 0
VOMITING: 0
EYE DISCHARGE: 0
CHEST TIGHTNESS: 0
DIARRHEA: 0
ABDOMINAL PAIN: 0
COUGH: 0
WHEEZING: 0
SINUS PRESSURE: 0
RHINORRHEA: 0
TROUBLE SWALLOWING: 0
SHORTNESS OF BREATH: 0

## 2023-05-18 NOTE — PROGRESS NOTES
Subjective:     Lily De León is a 33 y.o. female who complains today of:     Chief Complaint   Patient presents with    New Patient     Discuss SS results       HPI  She has PSG done on 23   an shows PEYTON. AHI  3.9  RDI 3.9   She has moderate snoring and O2 desaturation 14.2 min. She has short sleep latency and REM latency     She is having occasional snoring, she has c/o daytime sleepiness and tiredness. No C/o witness apnea. She does not  wakes up with gasping for air. C/o wakes up frequently during sleep. No complaint of morning headache. She does not have restful sleep. She does take daily naps. She does not fall asleep while watching TV. She does have a complaint of sleepiness while driving long distance . She does not have history of motor vehicle accident due to falling a sleep while driving. She does not have difficulty falling. No significant Weight gain in last 6-12 month     No sleep walking or eating. No sleep onset hallucination. No sleep paralysis . She said she has sleepiness more than usual , she has sleep attack in past.         Allergies:  Patient has no known allergies. Past Medical History:   Diagnosis Date    ADHD (attention deficit hyperactivity disorder)     Anxiety     Asthma     Depression      Past Surgical History:   Procedure Laterality Date    CYST REMOVAL      front of neck at age of 12     Family History   Problem Relation Age of Onset    No Known Problems Mother     No Known Problems Father          in car accident     Social History     Socioeconomic History    Marital status: Single     Spouse name: Not on file    Number of children: Not on file    Years of education: Not on file    Highest education level: Not on file   Occupational History    Not on file   Tobacco Use    Smoking status: Never    Smokeless tobacco: Never   Substance and Sexual Activity    Alcohol use:  Yes     Alcohol/week: 4.0 standard drinks     Types: 2 Glasses of wine, 2 Shots of liquor per

## 2024-02-13 ENCOUNTER — OFFICE VISIT (OUTPATIENT)
Dept: FAMILY MEDICINE CLINIC | Age: 29
End: 2024-02-13
Payer: COMMERCIAL

## 2024-02-13 VITALS
SYSTOLIC BLOOD PRESSURE: 110 MMHG | BODY MASS INDEX: 35.16 KG/M2 | WEIGHT: 211 LBS | OXYGEN SATURATION: 97 % | HEART RATE: 100 BPM | HEIGHT: 65 IN | DIASTOLIC BLOOD PRESSURE: 78 MMHG | TEMPERATURE: 98.7 F

## 2024-02-13 DIAGNOSIS — G43.709 CHRONIC MIGRAINE WITHOUT AURA WITHOUT STATUS MIGRAINOSUS, NOT INTRACTABLE: ICD-10-CM

## 2024-02-13 DIAGNOSIS — F32.1 CURRENT MODERATE EPISODE OF MAJOR DEPRESSIVE DISORDER, UNSPECIFIED WHETHER RECURRENT (HCC): ICD-10-CM

## 2024-02-13 DIAGNOSIS — J30.9 ALLERGIC RHINITIS, UNSPECIFIED SEASONALITY, UNSPECIFIED TRIGGER: ICD-10-CM

## 2024-02-13 DIAGNOSIS — N92.1 MENOMETRORRHAGIA: Primary | ICD-10-CM

## 2024-02-13 DIAGNOSIS — F41.9 ANXIETY: ICD-10-CM

## 2024-02-13 PROCEDURE — 99214 OFFICE O/P EST MOD 30 MIN: CPT | Performed by: STUDENT IN AN ORGANIZED HEALTH CARE EDUCATION/TRAINING PROGRAM

## 2024-02-13 RX ORDER — IBUPROFEN 800 MG/1
800 TABLET ORAL
Qty: 42 TABLET | Refills: 1 | Status: SHIPPED | OUTPATIENT
Start: 2024-02-13 | End: 2024-02-27

## 2024-02-13 RX ORDER — LEVOCETIRIZINE DIHYDROCHLORIDE 5 MG/1
5 TABLET, FILM COATED ORAL NIGHTLY
Qty: 90 TABLET | Refills: 3 | Status: SHIPPED | OUTPATIENT
Start: 2024-02-13

## 2024-02-13 RX ORDER — TRIAMCINOLONE ACETONIDE 55 UG/1
2 SPRAY, METERED NASAL 2 TIMES DAILY
Qty: 3 EACH | Refills: 1 | Status: SHIPPED | OUTPATIENT
Start: 2024-02-13 | End: 2024-03-14

## 2024-02-13 RX ORDER — RIZATRIPTAN BENZOATE 10 MG/1
10 TABLET ORAL
Qty: 27 TABLET | Refills: 1 | Status: SHIPPED | OUTPATIENT
Start: 2024-02-13 | End: 2024-02-13

## 2024-02-13 RX ORDER — FLUTICASONE PROPIONATE 50 MCG
2 SPRAY, SUSPENSION (ML) NASAL DAILY
COMMUNITY
Start: 2023-08-12 | End: 2024-08-11

## 2024-02-13 RX ORDER — ESTRADIOL 1 MG/1
1 TABLET ORAL DAILY
Qty: 21 TABLET | Refills: 0 | Status: SHIPPED | OUTPATIENT
Start: 2024-02-13

## 2024-02-13 NOTE — PROGRESS NOTES
Subjective  Meaghan M Baum, 28 y.o. female presents today with:  Chief Complaint   Patient presents with    Menstrual Problem     States she has been having her period, everyday for the past 2 months.     Allergies     States her allergies remain an issue, even with using nasal spray & Claritin. States she is really congested all of the time. Is having migraines with this as well.        Patient with acute appointment today.    Patient with concern today for breakthrough bleeding with nexplanon. This is the first time this has happened. It has been going on the past couple weeks. Some days are heavier and some are lighter. Has not had this since nexplanon was first placed.     Pt also with concern for worsening allergies. She does report a new animal that lived with her at the start of the worsening. The care is now out of her residence and symptoms may have improved a little. She feels congested all the time. She is having more frequent headahces. Currently using claritin and nasal spray, which help minimally. She is having more frequent migraines because of this.    Pt also following up on depression and anxiety. Currently using prozac 40mg daily and hydroxyzine as needed. These are helping. No side effects. No SI or HI. No other concerns at this time.     PHQ-2 Over the past 2 weeks, how often have you been bothered by any of the following problems?  Little interest or pleasure in doing things: Several days  Feeling down, depressed, or hopeless: Several days  PHQ-2 Score: 2  PHQ-9 Over the past 2 weeks, how often have you been bothered by any of the following problems?  Trouble falling or staying asleep, or sleeping too much: Nearly every day  Feeling tired or having little energy: Nearly every day  Poor appetite or overeating: More than half the days  Feeling bad about yourself - or that you are a failure or have let yourself or your family down: Not at all  Trouble concentrating on things, such as reading the

## 2024-02-15 ENCOUNTER — TELEPHONE (OUTPATIENT)
Dept: FAMILY MEDICINE CLINIC | Age: 29
End: 2024-02-15

## 2024-02-19 NOTE — TELEPHONE ENCOUNTER
[facility] sent response (scanned into chart) Xyzal is not on patients formulary, so medication is not covered. I called the # on the form & spoke with rep. Who stated this is not a covered medication, even with a PA. They stated patient could pay out of pocket for medication or we could attempt to complete formulary exception form. I asked this form be sent.     I attempted to contact patient & LMVOM for her to call me back. Would like to know if she would like purchase medication out of pocket or if she would like form completed once received. This medication is available over the counter.

## 2024-02-22 NOTE — TELEPHONE ENCOUNTER
ALIVIA informed PA was done for Xyzal & that her insurance does not cover this medication. Informed it was something that could be purchased OTC. Informed if she wanted to discuss something different, we could discuss at appt in March. Informed to call back with any other questions.

## 2024-03-12 ENCOUNTER — TELEMEDICINE (OUTPATIENT)
Dept: FAMILY MEDICINE CLINIC | Age: 29
End: 2024-03-12
Payer: COMMERCIAL

## 2024-03-12 DIAGNOSIS — G43.709 CHRONIC MIGRAINE WITHOUT AURA WITHOUT STATUS MIGRAINOSUS, NOT INTRACTABLE: ICD-10-CM

## 2024-03-12 DIAGNOSIS — F41.9 ANXIETY AND DEPRESSION: ICD-10-CM

## 2024-03-12 DIAGNOSIS — F32.A ANXIETY AND DEPRESSION: ICD-10-CM

## 2024-03-12 DIAGNOSIS — N92.1 MENOMETRORRHAGIA: Primary | ICD-10-CM

## 2024-03-12 PROCEDURE — 99214 OFFICE O/P EST MOD 30 MIN: CPT | Performed by: STUDENT IN AN ORGANIZED HEALTH CARE EDUCATION/TRAINING PROGRAM

## 2024-03-12 RX ORDER — RIZATRIPTAN BENZOATE 10 MG/1
10 TABLET ORAL
Qty: 27 TABLET | Refills: 1 | Status: SHIPPED | OUTPATIENT
Start: 2024-03-12 | End: 2024-03-12

## 2024-03-12 ASSESSMENT — ENCOUNTER SYMPTOMS
COUGH: 0
WHEEZING: 0
DIARRHEA: 0
RHINORRHEA: 0
ABDOMINAL PAIN: 0
NAUSEA: 0
EYE DISCHARGE: 0
SHORTNESS OF BREATH: 0
SORE THROAT: 0
VOMITING: 0

## 2024-03-12 NOTE — PROGRESS NOTES
short of breath with talking.  She does not appear to be in respiratory distress.           --Coretta Urias, DO

## 2024-04-18 DIAGNOSIS — G43.709 CHRONIC MIGRAINE WITHOUT AURA WITHOUT STATUS MIGRAINOSUS, NOT INTRACTABLE: ICD-10-CM

## 2024-04-18 RX ORDER — RIZATRIPTAN BENZOATE 10 MG/1
10 TABLET ORAL
Qty: 27 TABLET | Refills: 1 | Status: SHIPPED | OUTPATIENT
Start: 2024-04-18 | End: 2024-04-18

## 2024-08-02 ENCOUNTER — PATIENT MESSAGE (OUTPATIENT)
Dept: FAMILY MEDICINE CLINIC | Age: 29
End: 2024-08-02

## 2024-08-02 NOTE — TELEPHONE ENCOUNTER
From: Meaghan Barry  To: Dr. Coretta Urias  Sent: 8/2/2024 8:52 AM EDT  Subject: Appointment Request    Appointment Request From: Meaghan Barry    With Provider: Coretta Urias DO [[facility]]    Preferred Date Range: 8/5/2024 – 8/6/2024    Preferred Times: Monday Morning, Tuesday Morning, Wednesday Morning, Thursday Morning, Friday Morning    Reason for visit: Request an Appointment    Comments:  Sleep issues interfering with work and driving, period back 2 months

## 2024-08-05 ENCOUNTER — TELEPHONE (OUTPATIENT)
Dept: PULMONOLOGY | Age: 29
End: 2024-08-05

## 2024-08-05 NOTE — TELEPHONE ENCOUNTER
Patient is calling to requesting narcolepcy sleep study be reordered that was ordered last year.  She did not have testing done because she could not afford it at that time.    Thank you.

## 2024-08-13 SDOH — ECONOMIC STABILITY: FOOD INSECURITY: WITHIN THE PAST 12 MONTHS, THE FOOD YOU BOUGHT JUST DIDN'T LAST AND YOU DIDN'T HAVE MONEY TO GET MORE.: NEVER TRUE

## 2024-08-13 SDOH — ECONOMIC STABILITY: INCOME INSECURITY: HOW HARD IS IT FOR YOU TO PAY FOR THE VERY BASICS LIKE FOOD, HOUSING, MEDICAL CARE, AND HEATING?: NOT VERY HARD

## 2024-08-13 SDOH — ECONOMIC STABILITY: FOOD INSECURITY: WITHIN THE PAST 12 MONTHS, YOU WORRIED THAT YOUR FOOD WOULD RUN OUT BEFORE YOU GOT MONEY TO BUY MORE.: SOMETIMES TRUE

## 2024-08-14 ENCOUNTER — TELEMEDICINE (OUTPATIENT)
Dept: FAMILY MEDICINE CLINIC | Age: 29
End: 2024-08-14
Payer: COMMERCIAL

## 2024-08-14 DIAGNOSIS — Z97.5 BREAKTHROUGH BLEEDING ON NEXPLANON: ICD-10-CM

## 2024-08-14 DIAGNOSIS — N92.1 BREAKTHROUGH BLEEDING ON NEXPLANON: ICD-10-CM

## 2024-08-14 DIAGNOSIS — G43.709 CHRONIC MIGRAINE WITHOUT AURA WITHOUT STATUS MIGRAINOSUS, NOT INTRACTABLE: Primary | ICD-10-CM

## 2024-08-14 DIAGNOSIS — N92.1 MENOMETRORRHAGIA: ICD-10-CM

## 2024-08-14 DIAGNOSIS — F41.9 ANXIETY: ICD-10-CM

## 2024-08-14 DIAGNOSIS — F32.1 CURRENT MODERATE EPISODE OF MAJOR DEPRESSIVE DISORDER, UNSPECIFIED WHETHER RECURRENT (HCC): ICD-10-CM

## 2024-08-14 PROCEDURE — 99214 OFFICE O/P EST MOD 30 MIN: CPT | Performed by: STUDENT IN AN ORGANIZED HEALTH CARE EDUCATION/TRAINING PROGRAM

## 2024-08-14 RX ORDER — RIZATRIPTAN BENZOATE 10 MG/1
10 TABLET ORAL
Qty: 27 TABLET | Refills: 1 | Status: SHIPPED | OUTPATIENT
Start: 2024-08-14 | End: 2024-08-14

## 2024-08-14 RX ORDER — FLUOXETINE 10 MG/1
10 TABLET, FILM COATED ORAL DAILY
Qty: 90 TABLET | Refills: 1 | Status: SHIPPED | OUTPATIENT
Start: 2024-08-14

## 2024-08-14 RX ORDER — ESTRADIOL 2 MG/1
2 TABLET ORAL DAILY
Qty: 7 TABLET | Refills: 0 | Status: SHIPPED | OUTPATIENT
Start: 2024-08-14

## 2024-08-14 RX ORDER — IBUPROFEN 800 MG/1
800 TABLET ORAL
Qty: 90 TABLET | Refills: 1 | Status: SHIPPED | OUTPATIENT
Start: 2024-08-14 | End: 2024-09-13

## 2024-08-14 NOTE — PROGRESS NOTES
occasionally use hydroxyzine as needed for breakthrough anxiety.    Patient also with menometrorrhagia.  She has been having some breakthrough bleeding with the Nexplanon.  Has had a menstrual cycle for the last several weeks.  Has been very bothersome.  She has had this once before and used estradiol which did help.  She would like to try this again.  She has no other concerns at this time.    Review of Systems   Constitutional:  Negative for chills, fatigue, fever and unexpected weight change.   HENT:  Negative for congestion, rhinorrhea and sore throat.    Eyes:  Negative for discharge.   Respiratory:  Negative for cough, shortness of breath and wheezing.    Cardiovascular:  Negative for chest pain, palpitations and leg swelling.   Gastrointestinal:  Negative for abdominal pain, diarrhea, nausea and vomiting.   Genitourinary:  Positive for menstrual problem. Negative for difficulty urinating.   Musculoskeletal:  Negative for arthralgias and joint swelling.   Skin:  Negative for rash.   Neurological:  Negative for weakness, light-headedness, numbness and headaches.   Psychiatric/Behavioral:  Negative for confusion and suicidal ideas. The patient is not nervous/anxious.           Objective   Patient-Reported Vitals  No data recorded     Physical Exam  Due to nature of virtual visit, unable to complete full physical exam at this time, patient is on central talking.  She does not appear to be in respiratory distress           --Coretta Urias, DO

## 2024-08-16 ASSESSMENT — ENCOUNTER SYMPTOMS
WHEEZING: 0
EYE DISCHARGE: 0
SHORTNESS OF BREATH: 0
COUGH: 0
NAUSEA: 0
ABDOMINAL PAIN: 0
VOMITING: 0
SORE THROAT: 0
DIARRHEA: 0

## 2024-08-23 ENCOUNTER — TELEMEDICINE (OUTPATIENT)
Dept: PULMONOLOGY | Age: 29
End: 2024-08-23
Payer: COMMERCIAL

## 2024-08-23 DIAGNOSIS — E66.9 OBESITY (BMI 30-39.9): ICD-10-CM

## 2024-08-23 DIAGNOSIS — R09.02 HYPOXIA: ICD-10-CM

## 2024-08-23 DIAGNOSIS — G47.10 HYPERSOMNIA: Primary | ICD-10-CM

## 2024-08-23 PROCEDURE — 99215 OFFICE O/P EST HI 40 MIN: CPT | Performed by: INTERNAL MEDICINE

## 2024-08-23 NOTE — PROGRESS NOTES
Subjective:             Meaghan Barry is a 29 y.o. female who complains today of:     No chief complaint on file.      HPI  She has PSG done on 4/23/23   an shows PEYTON.  AHI  3.9  RDI 3.9   She has moderate snoring and O2 desaturation 14.2 min.  She has short sleep latency and REM latency      She is having occasional snoring, she has c/o daytime sleepiness and tiredness.  No C/o witness apnea.She does not  wakes up with gasping for air.   C/o wakes up frequently during sleep. No complaint of morning headache.  She does not have restful sleep.She does take daily naps.  She does not fall asleep while watching TV.   She does have a complaint of sleepiness while driving long distance.  She does not have history of motor vehicle accident due to falling a sleep while driving.  She does not have difficulty falling.   She lost 29 lbs since last visit      No sleep walking or eating. No sleep onset hallucination. No sleep paralysis . She said she has sleepiness more than usual , she has sleep attack in past.     She said she did  not have insurance before and she did not go for MSLT or  O2 use during sleep.       Allergies:  Patient has no known allergies.  Past Medical History:   Diagnosis Date    ADHD (attention deficit hyperactivity disorder)     Anxiety     Asthma     Depression     Sleep apnea Last year 2023     Past Surgical History:   Procedure Laterality Date    CYST REMOVAL      front of neck at age of 6     Family History   Problem Relation Age of Onset    Anemia Mother     Depression Father     Cancer Maternal Grandmother      Social History     Socioeconomic History    Marital status: Single     Spouse name: Not on file    Number of children: Not on file    Years of education: Not on file    Highest education level: Not on file   Occupational History    Not on file   Tobacco Use    Smoking status: Never    Smokeless tobacco: Never   Substance and Sexual Activity    Alcohol use: Yes     Alcohol/week: 4.0  standard drinks of alcohol     Types: 2 Glasses of wine, 2 Shots of liquor per week     Comment: couple times a month, wine 3-4 drinks    Drug use: Never    Sexual activity: Not Currently     Partners: Male   Other Topics Concern    Not on file   Social History Narrative    Not on file     Social Determinants of Health     Financial Resource Strain: Low Risk  (3/20/2023)    Overall Financial Resource Strain (CARDIA)     Difficulty of Paying Living Expenses: Not very hard   Food Insecurity: Not on file (8/13/2024)   Transportation Needs: Unknown (3/20/2023)    PRAPARE - Transportation     Lack of Transportation (Medical): Not on file     Lack of Transportation (Non-Medical): No   Physical Activity: Not on file   Stress: Not on file   Social Connections: Not on file   Intimate Partner Violence: Not on file   Housing Stability: Unknown (8/13/2024)    Housing Stability Vital Sign     Unable to Pay for Housing in the Last Year: Not on file     Number of Times Moved in the Last Year: Not on file     Homeless in the Last Year: No         Review of Systems   Constitutional:  Negative for chills, diaphoresis, fatigue and fever.   HENT:  Negative for congestion, mouth sores, nosebleeds, postnasal drip, rhinorrhea, sinus pressure, sneezing, sore throat, trouble swallowing and voice change.         Snoring   Eyes:  Negative for discharge, itching and visual disturbance.   Respiratory:  Negative for cough, chest tightness, shortness of breath and wheezing.    Cardiovascular:  Negative for chest pain, palpitations and leg swelling.   Gastrointestinal:  Negative for abdominal pain, diarrhea, nausea and vomiting.   Genitourinary:  Negative for difficulty urinating and hematuria.   Musculoskeletal:  Negative for arthralgias, joint swelling and myalgias.   Skin:  Negative for rash.   Allergic/Immunologic: Negative for environmental allergies and food allergies.   Neurological:  Negative for dizziness, tremors, weakness and headaches.

## 2024-08-29 ASSESSMENT — ENCOUNTER SYMPTOMS
SORE THROAT: 0
CHEST TIGHTNESS: 0
EYE DISCHARGE: 0
NAUSEA: 0
SHORTNESS OF BREATH: 0
EYE ITCHING: 0
WHEEZING: 0
VOMITING: 0
SINUS PRESSURE: 0
DIARRHEA: 0
TROUBLE SWALLOWING: 0
COUGH: 0
VOICE CHANGE: 0
RHINORRHEA: 0
ABDOMINAL PAIN: 0

## 2024-08-30 NOTE — PROGRESS NOTES
2024    TELEHEALTH EVALUATION -- Audio/Visual    HPI:    Meaghan Barry (:  1995) has requested an audio/video evaluation for the following concern(s):  She has PSG done on 23   an shows PEYTON.  AHI  3.9  RDI 3.9   She has moderate snoring and O2 desaturation 14.2 min.  She has short sleep latency and REM latency.  She is having occasional snoring, she has c/o daytime sleepiness and tiredness.  No C/o witness apnea.She does not  wakes up with gasping for air.   C/o wakes up frequently during sleep. No complaint of morning headache.  She does not have restful sleep.She does take daily naps.  She does not fall asleep while watching TV.   She does have a complaint of sleepiness while driving long distance.  She does not have history of motor vehicle accident due to falling a sleep while driving.  She does not have difficulty falling.   She lost 29 lbs since last visit      No sleep walking or eating. No sleep onset hallucination. No sleep paralysis . She said she has sleepiness more than usual , she has sleep attack in past.     She said she did  not have insurance before and she did not go for MSLT or  O2 use during sleep.     Review of Systems   Constitutional:  Negative for chills, diaphoresis, fatigue and fever.   HENT:  Negative for congestion, mouth sores, nosebleeds, postnasal drip, rhinorrhea, sinus pressure, sneezing, sore throat, trouble swallowing and voice change.         Snoring   Eyes:  Negative for discharge, itching and visual disturbance.   Respiratory:  Negative for cough, chest tightness, shortness of breath and wheezing.    Cardiovascular:  Negative for chest pain, palpitations and leg swelling.   Gastrointestinal:  Negative for abdominal pain, diarrhea, nausea and vomiting.   Genitourinary:  Negative for difficulty urinating and hematuria.   Musculoskeletal:  Negative for arthralgias, joint swelling and myalgias.   Skin:  Negative for rash.   Allergic/Immunologic: Negative for  \"[x]\" Indicates a positive item  \"[]\" Indicates a negative item  -- DELETE ALL ITEMS NOT EXAMINED]  Vital Signs: (As obtained by patient/caregiver or practitioner observation)    Blood pressure-  Heart rate-    Respiratory rate-    Temperature-  Pulse oximetry-     Constitutional: [x] Appears well-developed and well-nourished [] No apparent distress      [] Abnormal-   Mental status  [x] Alert and awake  [x] Oriented to person/place/time [x]Able to follow commands      Eyes:  EOM    [x]  Normal  [] Abnormal-  Sclera  [x]  Normal  [] Abnormal -         Discharge [x]  None visible  [] Abnormal -    HENT:   [x] Normocephalic, atraumatic.  [] Abnormal   [] Mouth/Throat: Mucous membranes are moist.     External Ears [] Normal  [] Abnormal-     Neck: [x] No visualized mass     Pulmonary/Chest: [x] Respiratory effort normal.  [x] No visualized signs of difficulty breathing or respiratory distress        [] Abnormal-      Musculoskeletal:   [] Normal gait with no signs of ataxia         [] Normal range of motion of neck        [] Abnormal-       Neurological:        [x] No Facial Asymmetry (Cranial nerve 7 motor function) (limited exam to video visit)          [] No gaze palsy        [] Abnormal-         Skin:        [x] No significant exanthematous lesions or discoloration noted on facial skin         [] Abnormal-            Psychiatric:       [x] Normal Affect [] No Hallucinations        [] Abnormal-     Other pertinent observable physical exam findings-     ASSESSMENT/PLAN:  1. Hypersomnia  She has PSG done last year on 4/23/23   an shows PEYTON.  AHI  3.9  RDI 3.9   She has moderate snoring and O2 desaturation 14.2 min.  She has short sleep latency and REM latency.  She states she did not have insurance that she did not go for MSLT.  She said she does have insurance now and would like to have a further workup done.  She does feel sleepy driving.    I would recommend PSG followed by MSLT for daytime sleepiness.  Recommend

## 2024-09-11 ENCOUNTER — TELEMEDICINE (OUTPATIENT)
Dept: FAMILY MEDICINE CLINIC | Age: 29
End: 2024-09-11
Payer: COMMERCIAL

## 2024-09-11 DIAGNOSIS — Z97.5 BREAKTHROUGH BLEEDING ON NEXPLANON: ICD-10-CM

## 2024-09-11 DIAGNOSIS — F32.1 CURRENT MODERATE EPISODE OF MAJOR DEPRESSIVE DISORDER, UNSPECIFIED WHETHER RECURRENT (HCC): Primary | ICD-10-CM

## 2024-09-11 DIAGNOSIS — N92.1 MENOMETRORRHAGIA: ICD-10-CM

## 2024-09-11 DIAGNOSIS — F41.9 ANXIETY: ICD-10-CM

## 2024-09-11 DIAGNOSIS — N92.1 BREAKTHROUGH BLEEDING ON NEXPLANON: ICD-10-CM

## 2024-09-11 PROCEDURE — 99214 OFFICE O/P EST MOD 30 MIN: CPT | Performed by: STUDENT IN AN ORGANIZED HEALTH CARE EDUCATION/TRAINING PROGRAM

## 2024-09-11 RX ORDER — ESTRADIOL 2 MG/1
2 TABLET ORAL DAILY
Qty: 14 TABLET | Refills: 0 | Status: SHIPPED | OUTPATIENT
Start: 2024-09-11

## 2024-09-11 ASSESSMENT — ENCOUNTER SYMPTOMS
SHORTNESS OF BREATH: 0
DIARRHEA: 0
SORE THROAT: 0
VOMITING: 0
RHINORRHEA: 0
NAUSEA: 0
ABDOMINAL PAIN: 0
WHEEZING: 0
EYE DISCHARGE: 0
COUGH: 0

## 2025-01-10 ENCOUNTER — OFFICE VISIT (OUTPATIENT)
Age: 30
End: 2025-01-10
Payer: COMMERCIAL

## 2025-01-10 VITALS
TEMPERATURE: 98.7 F | DIASTOLIC BLOOD PRESSURE: 68 MMHG | WEIGHT: 212 LBS | BODY MASS INDEX: 35.32 KG/M2 | SYSTOLIC BLOOD PRESSURE: 110 MMHG | OXYGEN SATURATION: 97 % | HEART RATE: 91 BPM | HEIGHT: 65 IN

## 2025-01-10 DIAGNOSIS — F41.9 ANXIETY AND DEPRESSION: Primary | ICD-10-CM

## 2025-01-10 DIAGNOSIS — F32.A ANXIETY AND DEPRESSION: Primary | ICD-10-CM

## 2025-01-10 DIAGNOSIS — B37.31 YEAST VAGINITIS: ICD-10-CM

## 2025-01-10 DIAGNOSIS — F32.1 CURRENT MODERATE EPISODE OF MAJOR DEPRESSIVE DISORDER, UNSPECIFIED WHETHER RECURRENT (HCC): ICD-10-CM

## 2025-01-10 PROCEDURE — 99214 OFFICE O/P EST MOD 30 MIN: CPT | Performed by: STUDENT IN AN ORGANIZED HEALTH CARE EDUCATION/TRAINING PROGRAM

## 2025-01-10 RX ORDER — FLUCONAZOLE 150 MG/1
150 TABLET ORAL
Qty: 2 TABLET | Refills: 0 | Status: SHIPPED | OUTPATIENT
Start: 2025-01-10 | End: 2025-01-16

## 2025-01-10 RX ORDER — HYDROXYZINE PAMOATE 25 MG/1
25 CAPSULE ORAL 3 TIMES DAILY PRN
Qty: 90 CAPSULE | Refills: 2 | Status: SHIPPED | OUTPATIENT
Start: 2025-01-10

## 2025-01-10 SDOH — ECONOMIC STABILITY: FOOD INSECURITY: WITHIN THE PAST 12 MONTHS, THE FOOD YOU BOUGHT JUST DIDN'T LAST AND YOU DIDN'T HAVE MONEY TO GET MORE.: NEVER TRUE

## 2025-01-10 SDOH — ECONOMIC STABILITY: FOOD INSECURITY: WITHIN THE PAST 12 MONTHS, YOU WORRIED THAT YOUR FOOD WOULD RUN OUT BEFORE YOU GOT MONEY TO BUY MORE.: NEVER TRUE

## 2025-01-10 ASSESSMENT — PATIENT HEALTH QUESTIONNAIRE - PHQ9
7. TROUBLE CONCENTRATING ON THINGS, SUCH AS READING THE NEWSPAPER OR WATCHING TELEVISION: SEVERAL DAYS
8. MOVING OR SPEAKING SO SLOWLY THAT OTHER PEOPLE COULD HAVE NOTICED. OR THE OPPOSITE, BEING SO FIGETY OR RESTLESS THAT YOU HAVE BEEN MOVING AROUND A LOT MORE THAN USUAL: MORE THAN HALF THE DAYS
SUM OF ALL RESPONSES TO PHQ QUESTIONS 1-9: 12
5. POOR APPETITE OR OVEREATING: SEVERAL DAYS
SUM OF ALL RESPONSES TO PHQ QUESTIONS 1-9: 12
4. FEELING TIRED OR HAVING LITTLE ENERGY: NEARLY EVERY DAY
10. IF YOU CHECKED OFF ANY PROBLEMS, HOW DIFFICULT HAVE THESE PROBLEMS MADE IT FOR YOU TO DO YOUR WORK, TAKE CARE OF THINGS AT HOME, OR GET ALONG WITH OTHER PEOPLE: SOMEWHAT DIFFICULT
SUM OF ALL RESPONSES TO PHQ QUESTIONS 1-9: 11
SUM OF ALL RESPONSES TO PHQ QUESTIONS 1-9: 12
2. FEELING DOWN, DEPRESSED OR HOPELESS: SEVERAL DAYS
3. TROUBLE FALLING OR STAYING ASLEEP: MORE THAN HALF THE DAYS
1. LITTLE INTEREST OR PLEASURE IN DOING THINGS: SEVERAL DAYS
SUM OF ALL RESPONSES TO PHQ9 QUESTIONS 1 & 2: 2
9. THOUGHTS THAT YOU WOULD BE BETTER OFF DEAD, OR OF HURTING YOURSELF: SEVERAL DAYS
6. FEELING BAD ABOUT YOURSELF - OR THAT YOU ARE A FAILURE OR HAVE LET YOURSELF OR YOUR FAMILY DOWN: NOT AT ALL

## 2025-01-10 ASSESSMENT — COLUMBIA-SUICIDE SEVERITY RATING SCALE - C-SSRS
6. HAVE YOU EVER DONE ANYTHING, STARTED TO DO ANYTHING, OR PREPARED TO DO ANYTHING TO END YOUR LIFE?: YES
2. HAVE YOU ACTUALLY HAD ANY THOUGHTS OF KILLING YOURSELF?: NO
7. DID THIS OCCUR IN THE LAST THREE MONTHS: NO
1. WITHIN THE PAST MONTH, HAVE YOU WISHED YOU WERE DEAD OR WISHED YOU COULD GO TO SLEEP AND NOT WAKE UP?: YES

## 2025-01-10 NOTE — PROGRESS NOTES
Subjective  Meaghansalinas Barry, 29 y.o. female presents today with:  Chief Complaint   Patient presents with    Vaginal Itching     For the past couple of weeks has been having vaginal itching, vaginal odor (sweet smelling) & vaginal discharge (white & thick). Denies concerns for STDs. Has not been sexually active since 2020.     Depression     Feels mood is stable. Denies feeling down & depressed.     Anxiety     Feels anxiety is high & not controlled.     Menometrorrhagia     States her period finally ended 2 weeks ago. Would like to now get US scheduled.        History of Present Illness  The patient is a 29-year-old female who presents for evaluation of yeast infection, depression, migraine, and weight loss.    She suspects a yeast infection, characterized by thick, white discharge with a sweet, bready odor. She reports no fishy smell. She has been experiencing this for the past 2 weeks.    Her mood has been stable, and she believes her depression is well-managed at this point in her life. She has successfully maintained a clean living environment for the past month, which she finds satisfying. She continues to take Prozac 10 mg but experiences some drowsiness. She had previously discontinued the medication due to this side effect. She has an upcoming appointment at the [facility] to confirm her narcolepsy diagnosis. She has undergone a sleep study for sleep apnea and was advised to repeat the study following a weight loss of 40 pounds.    Her migraines are inconsistent, with periods of relief lasting up to a week and a half, followed by daily occurrences for a week. She is attempting to identify potential triggers, such as cold ears, makeup application, or contact lens use. She typically wears glasses and works in a dimly lit environment. She works both day and night shifts, often sleeping during her night shifts.    She has lost 40 pounds, which she believes has positively impacted her physical health.  She

## 2025-01-13 ENCOUNTER — OFFICE VISIT (OUTPATIENT)
Age: 30
End: 2025-01-13
Payer: COMMERCIAL

## 2025-01-13 VITALS
HEIGHT: 65 IN | BODY MASS INDEX: 35.16 KG/M2 | OXYGEN SATURATION: 97 % | HEART RATE: 96 BPM | SYSTOLIC BLOOD PRESSURE: 110 MMHG | TEMPERATURE: 98.4 F | DIASTOLIC BLOOD PRESSURE: 80 MMHG | WEIGHT: 211 LBS

## 2025-01-13 DIAGNOSIS — Z01.419 WELL WOMAN EXAM WITH ROUTINE GYNECOLOGICAL EXAM: ICD-10-CM

## 2025-01-13 DIAGNOSIS — Z01.419 WELL WOMAN EXAM WITH ROUTINE GYNECOLOGICAL EXAM: Primary | ICD-10-CM

## 2025-01-13 PROCEDURE — 99395 PREV VISIT EST AGE 18-39: CPT | Performed by: STUDENT IN AN ORGANIZED HEALTH CARE EDUCATION/TRAINING PROGRAM

## 2025-01-13 NOTE — PROGRESS NOTES
History:  No date: CYST REMOVAL      Comment:  front of neck at age of 6  Medications/Contraceptives Affecting Cytology     Progestin Contraceptives - Implants Disp Start End     etonogestrel (NEXPLANON) implant 68 mg -- 9/27/2022 --     68 mg, Subdermal, ONCE    Cosign for Ordering: Accepted by Coretta Urias DO on 9/27/2022 12:00   PM        Social History    Tobacco Use      Smoking status: Never      Smokeless tobacco: Never       Standing Status:   Future     Standing Expiration Date:   1/13/2026     Order Specific Question:   Collection Type     Answer:   Thin Prep     Order Specific Question:   Prior Abnormal Pap Test     Answer:   No     Order Specific Question:   Screening or Diagnostic     Answer:   Screening     Order Specific Question:   HPV Requested?     Answer:   Yes -  If ASCUS Reflex HPV     Order Specific Question:   High Risk Patient     Answer:   N/A       No orders of the defined types were placed in this encounter.      Follow up:  Return as scheduled.      Coretta Urias DO

## 2025-01-14 LAB
CLUE CELLS VAG QL WET PREP: NORMAL
T VAGINALIS VAG QL WET PREP: NORMAL
TRICHOMONAS VAGINALIS SCREEN: NEGATIVE
YEAST VAG QL WET PREP: NORMAL

## 2025-01-16 LAB
C TRACH DNA CVX QL NAA+PROBE: NEGATIVE
N GONORRHOEA DNA CERV MUCUS QL NAA+PROBE: NEGATIVE

## 2025-01-17 ENCOUNTER — HOSPITAL ENCOUNTER (OUTPATIENT)
Dept: ULTRASOUND IMAGING | Age: 30
Discharge: HOME | End: 2025-01-19
Attending: STUDENT IN AN ORGANIZED HEALTH CARE EDUCATION/TRAINING PROGRAM
Payer: COMMERCIAL

## 2025-01-17 DIAGNOSIS — N92.1 BREAKTHROUGH BLEEDING ON NEXPLANON: ICD-10-CM

## 2025-01-17 DIAGNOSIS — Z97.5 BREAKTHROUGH BLEEDING ON NEXPLANON: ICD-10-CM

## 2025-01-17 DIAGNOSIS — N92.1 MENOMETRORRHAGIA: ICD-10-CM

## 2025-01-17 PROCEDURE — 76830 TRANSVAGINAL US NON-OB: CPT

## 2025-01-17 PROCEDURE — 93975 VASCULAR STUDY: CPT

## 2025-01-17 PROCEDURE — 76856 US EXAM PELVIC COMPLETE: CPT

## 2025-01-17 NOTE — RESULT ENCOUNTER NOTE
Please inform patient that Pap smear was normal.  Negative gonorrhea, chlamydia and trichomonas testing.  No BV or yeast.  Thanks

## 2025-01-31 ENCOUNTER — TELEPHONE (OUTPATIENT)
Age: 30
End: 2025-01-31

## 2025-01-31 NOTE — TELEPHONE ENCOUNTER
----- Message from Roxana WATTS sent at 1/31/2025  4:12 PM EST -----  Regarding: [facility] Appointment Request  [facility] Appointment Request    Patient needs appointment for E[facility] Appointment Type: Existing Condition Follow Up.    Patient Requested Dates(s): before February 19, 2025   Patient Requested Time: anytime   Provider Name: Coretta Urias DO    Reason for Appointment Request: Established Patient - Available appointments did not meet patient need  --------------------------------------------------------------------------------------------------------------------------    Relationship to Patient: Self     Call Back Information: OK to leave message on voicemail  Preferred Call Back Number: Phone [phone]      Patient has an appointment on 2/4/2025 that needs to reschedule due to the provider is unavailable and would like to have it before 2/19/2025 because the patient will be out of town.

## 2025-02-07 ENCOUNTER — OFFICE VISIT (OUTPATIENT)
Age: 30
End: 2025-02-07
Payer: COMMERCIAL

## 2025-02-07 VITALS
SYSTOLIC BLOOD PRESSURE: 116 MMHG | HEIGHT: 65 IN | TEMPERATURE: 98.3 F | HEART RATE: 94 BPM | BODY MASS INDEX: 35.49 KG/M2 | WEIGHT: 213 LBS | OXYGEN SATURATION: 98 % | DIASTOLIC BLOOD PRESSURE: 80 MMHG

## 2025-02-07 DIAGNOSIS — G47.33 OSA (OBSTRUCTIVE SLEEP APNEA): Primary | ICD-10-CM

## 2025-02-07 DIAGNOSIS — G47.429 NARCOLEPSY DUE TO UNDERLYING CONDITION WITHOUT CATAPLEXY: ICD-10-CM

## 2025-02-07 DIAGNOSIS — N84.1 CERVICAL POLYP: ICD-10-CM

## 2025-02-07 DIAGNOSIS — R40.0 DAYTIME SOMNOLENCE: ICD-10-CM

## 2025-02-07 DIAGNOSIS — G47.34 NOCTURNAL HYPOXIA: ICD-10-CM

## 2025-02-07 PROCEDURE — 99214 OFFICE O/P EST MOD 30 MIN: CPT | Performed by: STUDENT IN AN ORGANIZED HEALTH CARE EDUCATION/TRAINING PROGRAM

## 2025-02-07 NOTE — PROGRESS NOTES
Subjective  Meaghan Barry, 29 y.o. female presents today with:  Chief Complaint   Patient presents with    Forms     Would like to discuss having ADA forms completed for employer. States she has to drive an hour to work & she is unable to drive more than 20 min w/o falling asleep. States she did have a sleep study done & it was thought she could have narcolepsy, but testing was not completed to determine this.        History of Present Illness  The patient is a 29-year-old female who presents to discuss workplace accommodation, sleepiness while driving, depression, and uterine polyp.    She has not yet been required to return to her workplace, with the expectation that this will occur post-filing season. She recalls a period of daily commuting during her training, which she found extremely stressful due to near-accidents and episodes of falling asleep at the wheel. She expresses concern about the potential risk to her safety and well-being. She was previously evaluated by Dr. Iverson, who initially suspected narcolepsy. However, during a subsequent appointment, he seemed less certain of this diagnosis. He recommended a repeat MSLT, but due to her weight loss, she underwent a standard sleep study instead. Despite a diagnosis of sleep apnea, she was informed that she did not qualify for CPAP therapy due to an insufficient number of episodes. She has not yet started nighttime oxygen therapy as she did not receive a call for delivery. She experienced a brief lapse in insurance coverage following her sleep study, during which time she secured a new job that did not provide insurance for 3 months. She is open to consulting with a different sleep specialist.    She reports lifelong depression but does not believe it is the cause of her current symptoms. She is particularly concerned about her sleepiness while driving, a symptom she first noticed during her daily 1-hour commute to [place] from [place]. She now

## 2025-03-03 ENCOUNTER — OFFICE VISIT (OUTPATIENT)
Dept: OBGYN CLINIC | Age: 30
End: 2025-03-03
Payer: COMMERCIAL

## 2025-03-03 VITALS
HEART RATE: 100 BPM | WEIGHT: 210 LBS | SYSTOLIC BLOOD PRESSURE: 118 MMHG | DIASTOLIC BLOOD PRESSURE: 76 MMHG | BODY MASS INDEX: 34.95 KG/M2

## 2025-03-03 DIAGNOSIS — Z86.2 HISTORY OF ANEMIA: ICD-10-CM

## 2025-03-03 DIAGNOSIS — N93.9 ABNORMAL UTERINE BLEEDING (AUB): ICD-10-CM

## 2025-03-03 DIAGNOSIS — N84.1 CERVICAL POLYP: Primary | ICD-10-CM

## 2025-03-03 PROCEDURE — 99204 OFFICE O/P NEW MOD 45 MIN: CPT | Performed by: STUDENT IN AN ORGANIZED HEALTH CARE EDUCATION/TRAINING PROGRAM

## 2025-03-03 NOTE — PROGRESS NOTES
Meaghan Barry  3/3/2025              29 y.o.  Chief Complaint   Patient presents with   • Other     Cervical polyp seen PCP during examination. Been having consistent heavy periods painful- 90% of the year she is on her period. This is  been going on since 2021. She does have the nexplanon 3 years ago. Worked for the first month and then stopped working. Entire uterine lining came out at once 2 1/2 years ago.                 Primary Care Physician: Coretta Urias DO  HPI:   Meaghan Barry is a 29 y.o. female No obstetric history on file. presents for evaluation of AUB          Past Medical History:   Diagnosis Date   • ADHD (attention deficit hyperactivity disorder)    • Anxiety    • Asthma    • Depression    • Sleep apnea Last year 2023      Past Surgical History:   Procedure Laterality Date   • CYST REMOVAL      front of neck at age of 6     Family History   Problem Relation Age of Onset   • Anemia Mother    • Depression Father    • Cancer Maternal Grandmother      OB History   No obstetric history on file.       MEDICATIONS:  Current Outpatient Medications   Medication Sig Dispense Refill   • hydrOXYzine pamoate (VISTARIL) 25 MG capsule Take 1 capsule by mouth 3 times daily as needed for Anxiety 90 capsule 2   • FLUoxetine (PROZAC) 20 MG capsule Take 1 capsule by mouth daily 90 capsule 1   • ibuprofen (ADVIL;MOTRIN) 800 MG tablet Take 1 tablet by mouth 3 times daily (with meals) 90 tablet 1   • rizatriptan (MAXALT) 10 MG tablet Take 1 tablet by mouth once as needed for Migraine (max dose is 2 per day) May repeat in 2 hours if needed 27 tablet 1   • levocetirizine (XYZAL) 5 MG tablet Take 1 tablet by mouth nightly 90 tablet 3   • triamcinolone (NASACORT ALLERGY 24HR) 55 MCG/ACT nasal inhaler 2 sprays by Each Nostril route 2 times daily 3 each 1     Current Facility-Administered Medications   Medication Dose Route Frequency Provider Last Rate Last Admin   • etonogestrel (NEXPLANON) implant 68 mg  68 mg 
time, your period is typically much lighter and shorter. Twenty percent of women stop having their periods altogether after the first year. There are strings connected to the hormonal IUD. The hormonal IUD may reduce the risk of endometrial cancer. Although the IUD lasts for 8 years, you can have it removed sooner. Will order IUD today.     Today we discussed that based on clinical findings, a hysteroscopic polypectomy is recommended for further evaluation of cervical vs uterine polyp.  The procedure was discussed with the patient in detail.  She is aware that the procedure involves visualizing the endometrium with a camera and that tissue will be obtained for pathology.      Hysteroscopy discussed with D&C:     [x] Risks of injury to uterus, risk of uterine perforation, injury to bowel, injury to bladder, injury to ureter, injury to blood vessel,hemorrhage, transfusion, infection   [x] Pain control   [x] Recovery period and post-op expectations    All questions were answered and pt elects to proceed with a hysteroscopic polypectomy, nexplanon removal, mirena IUD insertion    3. History of anemia  -     CBC; Future      Return for PAT.    Orders Placed This Encounter   Procedures    CBC     Standing Status:   Future     Standing Expiration Date:   3/3/2026    TSH     Standing Status:   Future     Standing Expiration Date:   3/3/2026              
No

## 2025-03-04 ENCOUNTER — PREP FOR PROCEDURE (OUTPATIENT)
Dept: OBGYN CLINIC | Age: 30
End: 2025-03-04

## 2025-03-04 DIAGNOSIS — N93.9 ABNORMAL UTERINE BLEEDING: ICD-10-CM

## 2025-03-04 ASSESSMENT — ENCOUNTER SYMPTOMS
SHORTNESS OF BREATH: 0
CONSTIPATION: 1

## 2025-03-05 ENCOUNTER — TELEPHONE (OUTPATIENT)
Dept: OBGYN CLINIC | Age: 30
End: 2025-03-05

## 2025-03-05 NOTE — TELEPHONE ENCOUNTER
Pt was called and told of Surgery date,PAT date and time and her 2 week post op appt. Pt understood and will call the office if she has any questions or concerns.

## 2025-03-13 ENCOUNTER — OFFICE VISIT (OUTPATIENT)
Dept: PULMONOLOGY | Age: 30
End: 2025-03-13
Payer: COMMERCIAL

## 2025-03-13 VITALS
TEMPERATURE: 97.6 F | OXYGEN SATURATION: 96 % | HEART RATE: 85 BPM | WEIGHT: 210 LBS | DIASTOLIC BLOOD PRESSURE: 78 MMHG | SYSTOLIC BLOOD PRESSURE: 112 MMHG | HEIGHT: 65 IN | BODY MASS INDEX: 34.99 KG/M2

## 2025-03-13 DIAGNOSIS — G47.10 HYPERSOMNIA: Primary | ICD-10-CM

## 2025-03-13 PROCEDURE — 99213 OFFICE O/P EST LOW 20 MIN: CPT | Performed by: INTERNAL MEDICINE

## 2025-03-13 NOTE — PROGRESS NOTES
NEW PATIENT VISIT-PULMONARY/SLEEP    3/13/2025     REFERRING PHYSICIAN:  Coretta Urias DO     REASON FOR REFERRAL: Hypersomnia    HPI:     Meaghan Barry is a 29 y.o. female who was referred to sleep clinic for evaluation.   Has been having issues with sleepiness for few years.  Underwent an in lab sleep study in 2023 which did not show significant respiratory events.  There was some sleep-related hypoxia.  He has lost at least 30 pounds since then.  Continues to be sleepy and tired during the day.  Continues to have witnessed apneas.  M[place]by Sleepiness Scale is 19.  She takes naps during the day due to symptoms.  She dozes off easily.  She denies accidents or near misses.            Past Medical History   Past Medical History:   Diagnosis Date    ADHD (attention deficit hyperactivity disorder)     Anxiety     Asthma     Depression     Sleep apnea Last year 2023       Past Surgical History  Past Surgical History:   Procedure Laterality Date    CYST REMOVAL      front of neck at age of 6       Allergies  No Known Allergies    Medications  Current Outpatient Medications   Medication Sig Dispense Refill    hydrOXYzine pamoate (VISTARIL) 25 MG capsule Take 1 capsule by mouth 3 times daily as needed for Anxiety 90 capsule 2    FLUoxetine (PROZAC) 20 MG capsule Take 1 capsule by mouth daily 90 capsule 1    levocetirizine (XYZAL) 5 MG tablet Take 1 tablet by mouth nightly 90 tablet 3    ibuprofen (ADVIL;MOTRIN) 800 MG tablet Take 1 tablet by mouth 3 times daily (with meals) 90 tablet 1    rizatriptan (MAXALT) 10 MG tablet Take 1 tablet by mouth once as needed for Migraine (max dose is 2 per day) May repeat in 2 hours if needed 27 tablet 1    triamcinolone (NASACORT ALLERGY 24HR) 55 MCG/ACT nasal inhaler 2 sprays by Each Nostril route 2 times daily 3 each 1     Current Facility-Administered Medications   Medication Dose Route Frequency Provider Last Rate Last Admin    etonogestrel (NEXPLANON) implant 68 mg

## 2025-03-13 NOTE — PROGRESS NOTES
NEW PATIENT VISIT-PULMONARY/SLEEP    3/13/2025     REFERRING PHYSICIAN:  Coretta Urias DO     REASON FOR REFERRAL:  hypersomnia    HPI:     Meaghan Barry is a 29 y.o. female who was referred to sleep clinic for evaluation.     Current symptoms-           Sleep:   Patient snores, there is witnessed apnea.  air. Bed time: ***, wake up time: ***. Patient falls asleep in *** minutes. Patient wakes up *** times during the night. Patient does not wake up refreshed. Patient feels sleepy during the day and dozeoff easily or takes naps. Patient denies parasomnia. No leg movements at nighttime or during sleep.   Aline Sleepiness Scale: 19/24  STOP-BANG: 3/8              Past Medical History   Past Medical History:   Diagnosis Date    ADHD (attention deficit hyperactivity disorder)     Anxiety     Asthma     Depression     Sleep apnea Last year 2023       Past Surgical History  Past Surgical History:   Procedure Laterality Date    CYST REMOVAL      front of neck at age of 6       Allergies  No Known Allergies    Medications  Current Outpatient Medications   Medication Sig Dispense Refill    hydrOXYzine pamoate (VISTARIL) 25 MG capsule Take 1 capsule by mouth 3 times daily as needed for Anxiety 90 capsule 2    FLUoxetine (PROZAC) 20 MG capsule Take 1 capsule by mouth daily 90 capsule 1    levocetirizine (XYZAL) 5 MG tablet Take 1 tablet by mouth nightly 90 tablet 3    ibuprofen (ADVIL;MOTRIN) 800 MG tablet Take 1 tablet by mouth 3 times daily (with meals) 90 tablet 1    rizatriptan (MAXALT) 10 MG tablet Take 1 tablet by mouth once as needed for Migraine (max dose is 2 per day) May repeat in 2 hours if needed 27 tablet 1    triamcinolone (NASACORT ALLERGY 24HR) 55 MCG/ACT nasal inhaler 2 sprays by Each Nostril route 2 times daily 3 each 1     Current Facility-Administered Medications   Medication Dose Route Frequency Provider Last Rate Last Admin    etonogestrel (NEXPLANON) implant 68 mg  68 mg Subdermal Once

## 2025-03-17 ENCOUNTER — TELEPHONE (OUTPATIENT)
Dept: OBGYN CLINIC | Age: 30
End: 2025-03-17

## 2025-04-07 RX ORDER — SODIUM CHLORIDE 0.9 % (FLUSH) 0.9 %
5-40 SYRINGE (ML) INJECTION EVERY 12 HOURS SCHEDULED
Status: CANCELLED | OUTPATIENT
Start: 2025-04-16

## 2025-04-07 RX ORDER — SODIUM CHLORIDE 9 MG/ML
INJECTION, SOLUTION INTRAVENOUS PRN
Status: CANCELLED | OUTPATIENT
Start: 2025-04-16

## 2025-04-07 RX ORDER — SODIUM CHLORIDE 9 MG/ML
INJECTION, SOLUTION INTRAVENOUS CONTINUOUS
Status: CANCELLED | OUTPATIENT
Start: 2025-04-16

## 2025-04-07 RX ORDER — SODIUM CHLORIDE 0.9 % (FLUSH) 0.9 %
5-40 SYRINGE (ML) INJECTION PRN
Status: CANCELLED | OUTPATIENT
Start: 2025-04-16

## 2025-04-07 RX ORDER — ACETAMINOPHEN 500 MG
1000 TABLET ORAL ONCE
Status: CANCELLED | OUTPATIENT
Start: 2025-04-16 | End: 2025-04-07

## 2025-04-10 ENCOUNTER — HOSPITAL ENCOUNTER (OUTPATIENT)
Dept: PREADMISSION TESTING | Age: 30
Discharge: HOME | End: 2025-04-14
Payer: COMMERCIAL

## 2025-04-10 VITALS
BODY MASS INDEX: 35.89 KG/M2 | HEIGHT: 65 IN | WEIGHT: 215.4 LBS | SYSTOLIC BLOOD PRESSURE: 126 MMHG | RESPIRATION RATE: 16 BRPM | HEART RATE: 68 BPM | DIASTOLIC BLOOD PRESSURE: 79 MMHG | TEMPERATURE: 98.4 F | OXYGEN SATURATION: 97 %

## 2025-04-10 DIAGNOSIS — N93.9 ABNORMAL UTERINE BLEEDING: Primary | ICD-10-CM

## 2025-04-10 LAB
ERYTHROCYTE [DISTWIDTH] IN BLOOD BY AUTOMATED COUNT: 13.8 % (ref 11.5–14.5)
HCT VFR BLD AUTO: 42.4 % (ref 37–47)
HGB BLD-MCNC: 13.5 G/DL (ref 12–16)
MCH RBC QN AUTO: 24.9 PG (ref 27–31.3)
MCHC RBC AUTO-ENTMCNC: 31.8 % (ref 33–37)
MCV RBC AUTO: 78.1 FL (ref 79.4–94.8)
PLATELET # BLD AUTO: 256 K/UL (ref 130–400)
RBC # BLD AUTO: 5.43 M/UL (ref 4.2–5.4)
WBC # BLD AUTO: 7.5 K/UL (ref 4.8–10.8)

## 2025-04-10 PROCEDURE — 86850 RBC ANTIBODY SCREEN: CPT

## 2025-04-10 PROCEDURE — 86900 BLOOD TYPING SEROLOGIC ABO: CPT

## 2025-04-10 PROCEDURE — 86901 BLOOD TYPING SEROLOGIC RH(D): CPT

## 2025-04-10 PROCEDURE — 85027 COMPLETE CBC AUTOMATED: CPT

## 2025-04-10 RX ORDER — LEVONORGESTREL 52 MG/1
INTRAUTERINE DEVICE INTRAUTERINE
COMMUNITY
Start: 2025-03-05

## 2025-04-10 NOTE — H&P (VIEW-ONLY)
PRE ADMISSION TESTING    HISTORY AND PHYSICAL EXAM    PATIENT NAME:  Meaghan Barry    YOB: 1995  MRN:  [id number]  SERVICE DATE:  4/10/2025     Primary Care Physician: Coretta Urias DO   Surgeon: Dr. Liana Isidro    SUBJECTIVE  CHIEF COMPLAINT:  Abnormal uterine bleeding     The patient is a 29 y.o. female with significant past medical history of ADHD, Depression, Anxiety who presents for a preoperative consultation at the request of surgeon, Dr. Liana Isidro, who plans on performing Hysteroscopic polypectomy, nexplanon removal, Intrauterine Device insertion on 4/16/25 at [facility].     Patient reports that she had a long period of time where she was amenorrheic and then around 2021, she experienced a lengthy menstrual period that lasted for about 4 months before having a month break.  After then her menstrual periods were very irregular so she had a nexplanon placed (approx 3 yrs ago) in hopes that would help regulate her menstrual periods.  She had a month without a menstrual period following the nexplanon placement but then started having menstrual periods again.  There was a short period of time where her menstrual periods were normal (28 days, lasting approx 1 week).  She now has been experiencing month long periods with very little breaks in between.  She has severe cramping, dizziness, and nausea accompanying her menstrual periods as well as heavy bleeding with clots.  She saw her PCP and she noticed a polyp on her cervix and had an u/s and was then referred to Dr. Isidro. Due to her cervical polyp, the patient requests to have that removed and while under anesthesia, she requests to have the nexplanon removed and an IUD inserted for long term birth control.  She is scheduled for the procedure.        Known Anesthesia Problems: pt had anesthesia at 6 yrs old for cyst removal, no reactions but unsure of what type of anesthesia  Patient Objection to Receiving Blood Products:

## 2025-04-10 NOTE — H&P
breath and wheezing.    Cardiovascular:  Negative for chest pain, palpitations and leg swelling.   Gastrointestinal:  Positive for constipation. Negative for abdominal distention, abdominal pain, diarrhea, nausea and vomiting.   Endocrine: Negative for cold intolerance and heat intolerance.   Genitourinary:  Positive for menstrual problem. Negative for decreased urine volume, difficulty urinating, dysuria, frequency, hematuria, pelvic pain, urgency, vaginal discharge and vaginal pain.   Musculoskeletal:  Negative for arthralgias, back pain, gait problem, joint swelling, myalgias, neck pain and neck stiffness.   Skin:  Negative for rash and wound.   Allergic/Immunologic: Negative for environmental allergies, food allergies and immunocompromised state.   Neurological:  Positive for headaches (hx migraines). Negative for dizziness, tremors, seizures, syncope, speech difficulty, weakness, light-headedness and numbness.   Hematological:  Negative for adenopathy. Does not bruise/bleed easily.   Psychiatric/Behavioral:  Negative for agitation, confusion, sleep disturbance and suicidal ideas. The patient is not nervous/anxious.         OBJECTIVE  PHYSICAL EXAM:   Physical Exam  Vitals reviewed.   Constitutional:       General: She is not in acute distress.     Appearance: Normal appearance. She is obese. She is not ill-appearing, toxic-appearing or diaphoretic.   HENT:      Head: Normocephalic.      Right Ear: Tympanic membrane, ear canal and external ear normal. There is no impacted cerumen.      Left Ear: Tympanic membrane, ear canal and external ear normal. There is no impacted cerumen.      Nose: Nose normal. No congestion or rhinorrhea.      Mouth/Throat:      Mouth: Mucous membranes are moist.      Dentition: Abnormal dentition.      Pharynx: Oropharynx is clear. No oropharyngeal exudate or posterior oropharyngeal erythema.   Eyes:      General:         Right eye: No discharge.         Left eye: No discharge.

## 2025-04-11 LAB
ABO/RH: NORMAL
ANTIBODY SCREEN: NORMAL

## 2025-04-16 ENCOUNTER — ANESTHESIA (OUTPATIENT)
Dept: OPERATING ROOM | Age: 30
End: 2025-04-16
Payer: COMMERCIAL

## 2025-04-16 ENCOUNTER — ANESTHESIA EVENT (OUTPATIENT)
Dept: OPERATING ROOM | Age: 30
End: 2025-04-16
Payer: COMMERCIAL

## 2025-04-16 ENCOUNTER — HOSPITAL ENCOUNTER (OUTPATIENT)
Age: 30
Setting detail: OUTPATIENT SURGERY
Discharge: HOME | End: 2025-04-16
Attending: STUDENT IN AN ORGANIZED HEALTH CARE EDUCATION/TRAINING PROGRAM | Admitting: STUDENT IN AN ORGANIZED HEALTH CARE EDUCATION/TRAINING PROGRAM
Payer: COMMERCIAL

## 2025-04-16 VITALS
TEMPERATURE: 97.8 F | OXYGEN SATURATION: 97 % | SYSTOLIC BLOOD PRESSURE: 137 MMHG | WEIGHT: 215.4 LBS | BODY MASS INDEX: 35.89 KG/M2 | HEART RATE: 75 BPM | RESPIRATION RATE: 18 BRPM | HEIGHT: 65 IN | DIASTOLIC BLOOD PRESSURE: 81 MMHG

## 2025-04-16 DIAGNOSIS — Z97.5 BREAKTHROUGH BLEEDING ON NEXPLANON: ICD-10-CM

## 2025-04-16 DIAGNOSIS — G43.709 CHRONIC MIGRAINE WITHOUT AURA WITHOUT STATUS MIGRAINOSUS, NOT INTRACTABLE: ICD-10-CM

## 2025-04-16 DIAGNOSIS — N93.9 ABNORMAL UTERINE BLEEDING: ICD-10-CM

## 2025-04-16 DIAGNOSIS — N92.1 MENOMETRORRHAGIA: ICD-10-CM

## 2025-04-16 DIAGNOSIS — G89.18 POST-OP PAIN: ICD-10-CM

## 2025-04-16 DIAGNOSIS — N92.1 BREAKTHROUGH BLEEDING ON NEXPLANON: ICD-10-CM

## 2025-04-16 LAB
HCG, URINE, POC: NEGATIVE
Lab: NORMAL
NEGATIVE QC PASS/FAIL: NORMAL
POSITIVE QC PASS/FAIL: NORMAL

## 2025-04-16 PROCEDURE — 3700000001 HC ADD 15 MINUTES (ANESTHESIA): Performed by: STUDENT IN AN ORGANIZED HEALTH CARE EDUCATION/TRAINING PROGRAM

## 2025-04-16 PROCEDURE — 6370000000 HC RX 637 (ALT 250 FOR IP): Performed by: STUDENT IN AN ORGANIZED HEALTH CARE EDUCATION/TRAINING PROGRAM

## 2025-04-16 PROCEDURE — 3600000014 HC SURGERY LEVEL 4 ADDTL 15MIN: Performed by: STUDENT IN AN ORGANIZED HEALTH CARE EDUCATION/TRAINING PROGRAM

## 2025-04-16 PROCEDURE — 2720000010 HC SURG SUPPLY STERILE: Performed by: STUDENT IN AN ORGANIZED HEALTH CARE EDUCATION/TRAINING PROGRAM

## 2025-04-16 PROCEDURE — 58300 INSERT INTRAUTERINE DEVICE: CPT | Performed by: STUDENT IN AN ORGANIZED HEALTH CARE EDUCATION/TRAINING PROGRAM

## 2025-04-16 PROCEDURE — 88341 IMHCHEM/IMCYTCHM EA ADD ANTB: CPT

## 2025-04-16 PROCEDURE — 7100000011 HC PHASE II RECOVERY - ADDTL 15 MIN: Performed by: STUDENT IN AN ORGANIZED HEALTH CARE EDUCATION/TRAINING PROGRAM

## 2025-04-16 PROCEDURE — 88305 TISSUE EXAM BY PATHOLOGIST: CPT

## 2025-04-16 PROCEDURE — 7100000000 HC PACU RECOVERY - FIRST 15 MIN: Performed by: STUDENT IN AN ORGANIZED HEALTH CARE EDUCATION/TRAINING PROGRAM

## 2025-04-16 PROCEDURE — 3700000000 HC ANESTHESIA ATTENDED CARE: Performed by: STUDENT IN AN ORGANIZED HEALTH CARE EDUCATION/TRAINING PROGRAM

## 2025-04-16 PROCEDURE — 6360000002 HC RX W HCPCS: Performed by: NURSE ANESTHETIST, CERTIFIED REGISTERED

## 2025-04-16 PROCEDURE — 7100000010 HC PHASE II RECOVERY - FIRST 15 MIN: Performed by: STUDENT IN AN ORGANIZED HEALTH CARE EDUCATION/TRAINING PROGRAM

## 2025-04-16 PROCEDURE — 3600000004 HC SURGERY LEVEL 4 BASE: Performed by: STUDENT IN AN ORGANIZED HEALTH CARE EDUCATION/TRAINING PROGRAM

## 2025-04-16 PROCEDURE — 6360000002 HC RX W HCPCS: Performed by: STUDENT IN AN ORGANIZED HEALTH CARE EDUCATION/TRAINING PROGRAM

## 2025-04-16 PROCEDURE — 2580000003 HC RX 258: Performed by: STUDENT IN AN ORGANIZED HEALTH CARE EDUCATION/TRAINING PROGRAM

## 2025-04-16 PROCEDURE — 58558 HYSTEROSCOPY BIOPSY: CPT | Performed by: STUDENT IN AN ORGANIZED HEALTH CARE EDUCATION/TRAINING PROGRAM

## 2025-04-16 PROCEDURE — 88342 IMHCHEM/IMCYTCHM 1ST ANTB: CPT

## 2025-04-16 PROCEDURE — 11982 REMOVE DRUG IMPLANT DEVICE: CPT | Performed by: STUDENT IN AN ORGANIZED HEALTH CARE EDUCATION/TRAINING PROGRAM

## 2025-04-16 PROCEDURE — 2500000003 HC RX 250 WO HCPCS: Performed by: STUDENT IN AN ORGANIZED HEALTH CARE EDUCATION/TRAINING PROGRAM

## 2025-04-16 PROCEDURE — 7100000001 HC PACU RECOVERY - ADDTL 15 MIN: Performed by: STUDENT IN AN ORGANIZED HEALTH CARE EDUCATION/TRAINING PROGRAM

## 2025-04-16 PROCEDURE — 2709999900 HC NON-CHARGEABLE SUPPLY: Performed by: STUDENT IN AN ORGANIZED HEALTH CARE EDUCATION/TRAINING PROGRAM

## 2025-04-16 RX ORDER — DEXAMETHASONE SODIUM PHOSPHATE 10 MG/ML
INJECTION, SOLUTION INTRA-ARTICULAR; INTRALESIONAL; INTRAMUSCULAR; INTRAVENOUS; SOFT TISSUE
Status: DISCONTINUED | OUTPATIENT
Start: 2025-04-16 | End: 2025-04-16 | Stop reason: SDUPTHER

## 2025-04-16 RX ORDER — SODIUM CHLORIDE 9 MG/ML
INJECTION, SOLUTION INTRAVENOUS CONTINUOUS
Status: DISCONTINUED | OUTPATIENT
Start: 2025-04-16 | End: 2025-04-16 | Stop reason: HOSPADM

## 2025-04-16 RX ORDER — PROPOFOL 10 MG/ML
INJECTION, EMULSION INTRAVENOUS
Status: DISCONTINUED | OUTPATIENT
Start: 2025-04-16 | End: 2025-04-16 | Stop reason: SDUPTHER

## 2025-04-16 RX ORDER — OXYCODONE HYDROCHLORIDE 5 MG/1
5 TABLET ORAL
Status: COMPLETED | OUTPATIENT
Start: 2025-04-16 | End: 2025-04-16

## 2025-04-16 RX ORDER — SODIUM CHLORIDE 9 MG/ML
INJECTION, SOLUTION INTRAVENOUS PRN
Status: DISCONTINUED | OUTPATIENT
Start: 2025-04-16 | End: 2025-04-16 | Stop reason: HOSPADM

## 2025-04-16 RX ORDER — FENTANYL CITRATE 50 UG/ML
INJECTION, SOLUTION INTRAMUSCULAR; INTRAVENOUS
Status: DISCONTINUED | OUTPATIENT
Start: 2025-04-16 | End: 2025-04-16 | Stop reason: SDUPTHER

## 2025-04-16 RX ORDER — IBUPROFEN 800 MG/1
800 TABLET, FILM COATED ORAL
Qty: 90 TABLET | Refills: 0 | Status: SHIPPED | OUTPATIENT
Start: 2025-04-16 | End: 2025-05-16

## 2025-04-16 RX ORDER — DIPHENHYDRAMINE HYDROCHLORIDE 50 MG/ML
12.5 INJECTION, SOLUTION INTRAMUSCULAR; INTRAVENOUS
Status: DISCONTINUED | OUTPATIENT
Start: 2025-04-16 | End: 2025-04-16 | Stop reason: HOSPADM

## 2025-04-16 RX ORDER — MIDAZOLAM HYDROCHLORIDE 1 MG/ML
INJECTION, SOLUTION INTRAMUSCULAR; INTRAVENOUS
Status: DISCONTINUED | OUTPATIENT
Start: 2025-04-16 | End: 2025-04-16 | Stop reason: SDUPTHER

## 2025-04-16 RX ORDER — MEPERIDINE HYDROCHLORIDE 25 MG/ML
12.5 INJECTION INTRAMUSCULAR; INTRAVENOUS; SUBCUTANEOUS
Status: DISCONTINUED | OUTPATIENT
Start: 2025-04-16 | End: 2025-04-16 | Stop reason: HOSPADM

## 2025-04-16 RX ORDER — SODIUM CHLORIDE 0.9 % (FLUSH) 0.9 %
5-40 SYRINGE (ML) INJECTION PRN
Status: DISCONTINUED | OUTPATIENT
Start: 2025-04-16 | End: 2025-04-16 | Stop reason: HOSPADM

## 2025-04-16 RX ORDER — SODIUM CHLORIDE 0.9 % (FLUSH) 0.9 %
5-40 SYRINGE (ML) INJECTION EVERY 12 HOURS SCHEDULED
Status: DISCONTINUED | OUTPATIENT
Start: 2025-04-16 | End: 2025-04-16 | Stop reason: HOSPADM

## 2025-04-16 RX ORDER — FERRIC SUBSULFATE 0.21 G/G
LIQUID TOPICAL PRN
Status: DISCONTINUED | OUTPATIENT
Start: 2025-04-16 | End: 2025-04-16 | Stop reason: HOSPADM

## 2025-04-16 RX ORDER — FENTANYL CITRATE 0.05 MG/ML
50 INJECTION, SOLUTION INTRAMUSCULAR; INTRAVENOUS EVERY 10 MIN PRN
Status: DISCONTINUED | OUTPATIENT
Start: 2025-04-16 | End: 2025-04-16 | Stop reason: HOSPADM

## 2025-04-16 RX ORDER — OXYCODONE HYDROCHLORIDE 5 MG/1
TABLET ORAL
Status: DISCONTINUED
Start: 2025-04-16 | End: 2025-04-16 | Stop reason: HOSPADM

## 2025-04-16 RX ORDER — LIDOCAINE HYDROCHLORIDE 10 MG/ML
1 INJECTION, SOLUTION EPIDURAL; INFILTRATION; INTRACAUDAL; PERINEURAL
Status: DISCONTINUED | OUTPATIENT
Start: 2025-04-16 | End: 2025-04-16 | Stop reason: HOSPADM

## 2025-04-16 RX ORDER — MAGNESIUM HYDROXIDE 1200 MG/15ML
LIQUID ORAL CONTINUOUS PRN
Status: DISCONTINUED | OUTPATIENT
Start: 2025-04-16 | End: 2025-04-16 | Stop reason: HOSPADM

## 2025-04-16 RX ORDER — ONDANSETRON 2 MG/ML
4 INJECTION INTRAMUSCULAR; INTRAVENOUS
Status: DISCONTINUED | OUTPATIENT
Start: 2025-04-16 | End: 2025-04-16 | Stop reason: HOSPADM

## 2025-04-16 RX ORDER — BUPIVACAINE HYDROCHLORIDE 5 MG/ML
INJECTION, SOLUTION EPIDURAL; INTRACAUDAL; PERINEURAL PRN
Status: DISCONTINUED | OUTPATIENT
Start: 2025-04-16 | End: 2025-04-16 | Stop reason: HOSPADM

## 2025-04-16 RX ORDER — LIDOCAINE HYDROCHLORIDE 10 MG/ML
INJECTION, SOLUTION EPIDURAL; INFILTRATION; INTRACAUDAL; PERINEURAL
Status: DISCONTINUED | OUTPATIENT
Start: 2025-04-16 | End: 2025-04-16 | Stop reason: SDUPTHER

## 2025-04-16 RX ORDER — NALOXONE HYDROCHLORIDE 0.4 MG/ML
INJECTION, SOLUTION INTRAMUSCULAR; INTRAVENOUS; SUBCUTANEOUS PRN
Status: DISCONTINUED | OUTPATIENT
Start: 2025-04-16 | End: 2025-04-16 | Stop reason: HOSPADM

## 2025-04-16 RX ORDER — METOCLOPRAMIDE HYDROCHLORIDE 5 MG/ML
10 INJECTION INTRAMUSCULAR; INTRAVENOUS
Status: DISCONTINUED | OUTPATIENT
Start: 2025-04-16 | End: 2025-04-16 | Stop reason: HOSPADM

## 2025-04-16 RX ORDER — ACETAMINOPHEN 500 MG
1000 TABLET ORAL ONCE
Status: COMPLETED | OUTPATIENT
Start: 2025-04-16 | End: 2025-04-16

## 2025-04-16 RX ORDER — ONDANSETRON 2 MG/ML
INJECTION INTRAMUSCULAR; INTRAVENOUS
Status: DISCONTINUED | OUTPATIENT
Start: 2025-04-16 | End: 2025-04-16 | Stop reason: SDUPTHER

## 2025-04-16 RX ADMIN — PROPOFOL 200 MG: 10 INJECTION, EMULSION INTRAVENOUS at 13:21

## 2025-04-16 RX ADMIN — ONDANSETRON 4 MG: 2 INJECTION, SOLUTION INTRAMUSCULAR; INTRAVENOUS at 13:31

## 2025-04-16 RX ADMIN — FENTANYL CITRATE 25 MCG: 50 INJECTION, SOLUTION INTRAMUSCULAR; INTRAVENOUS at 13:57

## 2025-04-16 RX ADMIN — MIDAZOLAM HYDROCHLORIDE 2 MG: 1 INJECTION, SOLUTION INTRAMUSCULAR; INTRAVENOUS at 13:17

## 2025-04-16 RX ADMIN — FENTANYL CITRATE 25 MCG: 50 INJECTION, SOLUTION INTRAMUSCULAR; INTRAVENOUS at 13:45

## 2025-04-16 RX ADMIN — FENTANYL CITRATE 25 MCG: 50 INJECTION, SOLUTION INTRAMUSCULAR; INTRAVENOUS at 13:24

## 2025-04-16 RX ADMIN — ACETAMINOPHEN 1000 MG: 500 TABLET ORAL at 11:32

## 2025-04-16 RX ADMIN — DEXAMETHASONE SODIUM PHOSPHATE 10 MG: 10 INJECTION INTRAMUSCULAR; INTRAVENOUS at 13:31

## 2025-04-16 RX ADMIN — FENTANYL CITRATE 25 MCG: 50 INJECTION, SOLUTION INTRAMUSCULAR; INTRAVENOUS at 13:33

## 2025-04-16 RX ADMIN — OXYCODONE HYDROCHLORIDE 5 MG: 5 TABLET ORAL at 14:55

## 2025-04-16 RX ADMIN — SODIUM CHLORIDE: 0.9 INJECTION, SOLUTION INTRAVENOUS at 11:11

## 2025-04-16 RX ADMIN — LIDOCAINE HYDROCHLORIDE 50 MG: 10 INJECTION, SOLUTION EPIDURAL; INFILTRATION; INTRACAUDAL; PERINEURAL at 13:21

## 2025-04-16 ASSESSMENT — PAIN SCALES - GENERAL
PAINLEVEL_OUTOF10: 3
PAINLEVEL_OUTOF10: 5
PAINLEVEL_OUTOF10: 4
PAINLEVEL_OUTOF10: 5
PAINLEVEL_OUTOF10: 2

## 2025-04-16 ASSESSMENT — PAIN DESCRIPTION - LOCATION
LOCATION: VAGINA
LOCATION: VAGINA

## 2025-04-16 ASSESSMENT — PAIN DESCRIPTION - DESCRIPTORS
DESCRIPTORS: CRAMPING
DESCRIPTORS: CRAMPING
DESCRIPTORS: SORE

## 2025-04-16 ASSESSMENT — PAIN - FUNCTIONAL ASSESSMENT: PAIN_FUNCTIONAL_ASSESSMENT: 0-10

## 2025-04-16 NOTE — ANESTHESIA PRE PROCEDURE
Department of Anesthesiology  Preprocedure Note       Name:  Meaghan Barry   Age:  29 y.o.  :  1995                                          MRN:  [id number]         Date:  2025      Surgeon: Surgeon(s):  Liana Isidro DO    Procedure: Procedure(s):  HYSTEROSCOPIC POLYPECTOMY, DILATATION AND CURETTAGE. IUD INSERTION  NEXPLANON REMOVAL  (Dr. Isidro will bring iud)    Medications prior to admission:   Prior to Admission medications    Medication Sig Start Date End Date Taking? Authorizing Provider   hydrOXYzine pamoate (VISTARIL) 25 MG capsule Take 1 capsule by mouth 3 times daily as needed for Anxiety 1/10/25  Yes Coretta Urias DO   FLUoxetine (PROZAC) 20 MG capsule Take 1 capsule by mouth daily 1/10/25  Yes Coretta Urias DO   ibuprofen (ADVIL;MOTRIN) 800 MG tablet Take 1 tablet by mouth 3 times daily (with meals) 24 Yes Coretta Urias DO   rizatriptan (MAXALT) 10 MG tablet Take 1 tablet by mouth once as needed for Migraine (max dose is 2 per day) May repeat in 2 hours if needed 24 Yes Coretta Urias DO   MIRENA, 52 MG, IUD 52 mg  3/5/25   Provider, MD Magan   levocetirizine (XYZAL) 5 MG tablet Take 1 tablet by mouth nightly  Patient not taking: Reported on 2025   Coretta Urias DO   triamcinolone (NASACORT ALLERGY 24HR) 55 MCG/ACT nasal inhaler 2 sprays by Each Nostril route 2 times daily  Patient not taking: Reported on 4/10/2025 2/13/24 2/7/25  Coretta Urias DO       Current medications:    Current Facility-Administered Medications   Medication Dose Route Frequency Provider Last Rate Last Admin    0.9 % sodium chloride infusion   IntraVENous Continuous Liana Isidro  mL/hr at 25 1111 New Bag at 25 1111    sodium chloride flush 0.9 % injection 5-40 mL  5-40 mL IntraVENous 2 times per day Liana Isidro         sodium chloride flush 0.9 % injection 5-40 mL  5-40 mL IntraVENous PRN Liana Isidro,

## 2025-04-16 NOTE — OP NOTE
Operative Note  Department of Obstetrics and Gynecology      Patient: Meaghan Barry   : 1995  MRN: [id number]        Date of Procedure: 25     Pre-operative Diagnosis: 29 y.o. female    AUB   Cervical polyp     Post-operative Diagnosis: Same    Procedure: Nexplanon removal, hysteroscopic polypectomy, Mirena IUD insertion     Surgeon: Dr. Isidro    Anesthesia: General    Findings:  normal appearing vulva, vagina. Polyp protruding from cervical os. Normal appearing endometrial cavity, bilateral ostia visualized. Cervical polyp, base noted mid cervical canal, removed with myosure.    Total IV fluids/Blood products:  700 ml crystalloid    Urine Output:  50 ml      Estimated blood loss:  5 ml    Drains:  NA    Specimens:  cervical polyp, endometrial curettings     Instrument and Sponge Count: Correct x 2    Complications:  None    Condition:  Stable, transferred to post anesthesia recovery      Procedure:  The patient was taken to the operating room with running IV fluids. General anesthesia was administered without difficulty. She was placed in dorsal lithotomy position with Yellofin stirrups.    A sterile prep and drape was completed over left arm and  3 cc of 0.5% marcaine for local anesthetic was utilized. A 5 mm skin incision was made with an #11 blade scalpel just beyond the proximal tip of the insert and utilizing both blunt sharp dissection the device was identified grasped w/ hemostat and removed. Arm was dressed with steri strip and wrap dressing.     The patient was then prepped and draped in the usual sterile    A weighted speculum was inserted into the vagina.  A right angle retractor was placed anteriorly to allow visualization of the cervix. The cervix was grasped with a single-tooth tenaculum. The uterus was sounded to 8 cm. The cervix was progressively dilated to allow placement of a 5 mm hysteroscope. The hysteroscope was then introduced into the uterine cavity under direct

## 2025-04-16 NOTE — PROGRESS NOTES
CLINICAL PHARMACY NOTE: MEDS TO BEDS    Total # of Prescriptions Filled: 2   The following medications were delivered to the patient:  Tramadol 50mg Tab  Ibuprofen 800mg Tab     Additional Documentation:

## 2025-04-16 NOTE — INTERVAL H&P NOTE
Update History & Physical    The patient's History and Physical of April 10, 2025 was reviewed with the patient and I examined the patient. There was no change. The surgical procedure was confirmed by the patient and me.     Plan: The risks, benefits, expected outcome, and alternative to the recommended procedure have been discussed with the patient. Patient understands and wants to proceed with the procedure.     Electronically signed by Liana Isidro DO on 4/16/2025 at 1:02 PM

## 2025-04-16 NOTE — DISCHARGE INSTRUCTIONS
DISCHARGE INSTRUCTIONS    1. When you get home you may eat and drink anything.  2. You may do stairs the day of surgery.  3. Showering only.  No bathing or swimming until all incisions are completely healed.   4. You may drive once you are off all narcotics.   5. No douching, tampon, submerging in water or intercourse for 2 weeks and cleared by physician.    Please keep your post operative appointments as scheduled.     It is normal to have spotting, pink/red or brown vaginal discharge for the first 3-6 months following surgery.      Please call if you are unable to urinate, have profuse vaginal bleeding, have a fever > 101 degrees F, severe abdominal pain that is not relived by pain medication, nausea or vomiting that is not improving, and back pain that is unresponsive to Motrin, heating pad or massage.      If you have any other concerns or do not feel well please call or go to the Emergency Department for further evaluation.     [facility]  Outpatient Discharge Instructions    To continue your care at home, please follow the instructions below and any additional discharge instructions given to you by your physician.    GENERAL ANESTHESIA:  Do not drive or operate machinery for 24hrs after discharge,  Do not drink alcohol, take tranquilizers, sleeping medication, or any other medication not directly instructed by your physician,   Do not make any important decisions or sign any legal documents for 24hrs after surgery,  Have someone with you for 24hrs after surgery to assist you as needed.    ACTIVITY:  Light activity for 24hrs,  No heavy lifting or exercise until instructed by your physician,  You may resume normal activities once instructed by your physician,  Special Instruction: ___________________________________________________________________    FLUIDS AND DIET:  An upset stomach or feeling sick (nausea) can commonly occur after surgery and/or pain medication use. To help minimize nausea:  Do not

## 2025-04-16 NOTE — ANESTHESIA POSTPROCEDURE EVALUATION
Department of Anesthesiology  Postprocedure Note    Patient: Meaghan Barry  MRN: [id number]  YOB: 1995  Date of evaluation: 4/16/2025    Procedure Summary       Date: 04/16/25 Room / Location: [address]    Anesthesia Start: 1317 Anesthesia Stop: 1403    Procedures:       HYSTEROSCOPIC POLYPECTOMY, DILATATION AND CURETTAGE. IUD INSERTION      NEXPLANON REMOVAL, IUD INSERTION (MIRENA) (Arm Upper) Diagnosis:       Abnormal uterine bleeding      (Abnormal uterine bleeding [N93.9])    Surgeons: Liana Isidro DO Responsible Provider: Todd Schneider DO    Anesthesia Type: general ASA Status: 2            Anesthesia Type: No value filed.    Michael Phase I: Michael Score: 10    Michael Phase II:      Anesthesia Post Evaluation    Patient location during evaluation: PACU  Patient participation: complete - patient participated  Level of consciousness: sleepy but conscious  Pain score: 0  Airway patency: patent  Nausea & Vomiting: no nausea and no vomiting  Cardiovascular status: hemodynamically stable  Respiratory status: acceptable  Hydration status: euvolemic  Pain management: adequate        No notable events documented.

## 2025-04-16 NOTE — PROGRESS NOTES
Mom went to outpatient pharmacy to get the prescriptions and then pull the car up for transport, pt stable waiting in

## 2025-04-24 ENCOUNTER — HOSPITAL ENCOUNTER (OUTPATIENT)
Dept: SLEEP CENTER | Age: 30
Discharge: HOME | End: 2025-04-26
Attending: INTERNAL MEDICINE
Payer: COMMERCIAL

## 2025-04-24 DIAGNOSIS — G47.10 HYPERSOMNIA: ICD-10-CM

## 2025-04-24 PROCEDURE — 95810 POLYSOM 6/> YRS 4/> PARAM: CPT

## 2025-04-25 ENCOUNTER — HOSPITAL ENCOUNTER (OUTPATIENT)
Dept: SLEEP CENTER | Age: 30
Discharge: HOME | End: 2025-04-27
Attending: INTERNAL MEDICINE

## 2025-04-25 DIAGNOSIS — G47.10 HYPERSOMNIA: ICD-10-CM

## 2025-04-28 ENCOUNTER — RESULTS FOLLOW-UP (OUTPATIENT)
Dept: OBGYN | Age: 30
End: 2025-04-28

## 2025-04-28 ENCOUNTER — OFFICE VISIT (OUTPATIENT)
Dept: OBGYN CLINIC | Age: 30
End: 2025-04-28

## 2025-04-28 VITALS
HEART RATE: 93 BPM | SYSTOLIC BLOOD PRESSURE: 116 MMHG | WEIGHT: 204 LBS | DIASTOLIC BLOOD PRESSURE: 78 MMHG | BODY MASS INDEX: 33.58 KG/M2

## 2025-04-28 DIAGNOSIS — Z09 POSTOPERATIVE EXAMINATION: Primary | ICD-10-CM

## 2025-04-28 PROCEDURE — 99024 POSTOP FOLLOW-UP VISIT: CPT | Performed by: STUDENT IN AN ORGANIZED HEALTH CARE EDUCATION/TRAINING PROGRAM

## 2025-04-29 ASSESSMENT — ENCOUNTER SYMPTOMS
ABDOMINAL PAIN: 0
VOMITING: 0
DIARRHEA: 0
NAUSEA: 0
CONSTIPATION: 0

## 2025-04-29 NOTE — PROGRESS NOTES
HPI:   Meaghan Barry is a 29 y.o. female  here today for postoperative follow-up.    Patient underwent hysteroscopic polypectomy, IUD insertion, nexplanon removal on 25. Surgery was uncomplicated.     Surgical pathology reviewed---  FINAL DIAGNOSIS:   A. EMC -   MINUTE STRIPS OF CERVICAL EPITHELIUM WITH SQUAMOUS METAPLASIA.   SCANTY MINUTE STRIPS OF ENDOMETRIUM, NON-DIAGNOSTIC. SEE COMMENT.     B. UTERINE POLYP -   BENIGN ENDOCERVICAL POLYP.   STRIPS OF ECTOCERVICAL EPITHELIUM, NOT SUGGESTIVE OF HPV CHANGES, SEE   COMMENT.     Patient is doing well postoperatively.  Reports pain is well-controlled.  Says she has been spotting after IUD insertion but it seems to have tapered off.  Denies nausea, vomiting.  Denies fever, chills.  Denies abdominal pain, difficulty urinating, constipation    REVIEW OF SYSTEMS:  Review of Systems   Constitutional:  Negative for chills and fever.   Gastrointestinal:  Negative for abdominal pain, constipation, diarrhea, nausea and vomiting.   Genitourinary:  Positive for vaginal bleeding. Negative for dysuria, frequency, urgency and vaginal pain.   All other systems reviewed and are negative.         PHYSICAL EXAMINATION:    /78   Pulse 93   Wt 92.5 kg (204 lb)   LMP 2025 Comment: pregnancy test - negative  BMI 33.58 kg/m²      Physical Exam  Constitutional:       General: She is not in acute distress.     Appearance: Normal appearance. She is not ill-appearing, toxic-appearing or diaphoretic.   HENT:      Head: Normocephalic and atraumatic.   Eyes:      Extraocular Movements: Extraocular movements intact.   Cardiovascular:      Rate and Rhythm: Normal rate.   Pulmonary:      Effort: Pulmonary effort is normal. No respiratory distress.   Abdominal:      General: Abdomen is flat. There is no distension.      Palpations: Abdomen is soft.      Tenderness: There is no abdominal tenderness. There is no guarding or rebound.   Genitourinary:     General: Normal

## 2025-05-06 ENCOUNTER — TELEMEDICINE (OUTPATIENT)
Age: 30
End: 2025-05-06
Payer: COMMERCIAL

## 2025-05-06 DIAGNOSIS — N84.1 CERVICAL POLYP: ICD-10-CM

## 2025-05-06 DIAGNOSIS — G43.709 CHRONIC MIGRAINE WITHOUT AURA WITHOUT STATUS MIGRAINOSUS, NOT INTRACTABLE: ICD-10-CM

## 2025-05-06 DIAGNOSIS — G47.10 HYPERSOMNIA: ICD-10-CM

## 2025-05-06 DIAGNOSIS — F41.9 ANXIETY AND DEPRESSION: Primary | ICD-10-CM

## 2025-05-06 DIAGNOSIS — F32.A ANXIETY AND DEPRESSION: Primary | ICD-10-CM

## 2025-05-06 PROCEDURE — 99214 OFFICE O/P EST MOD 30 MIN: CPT | Performed by: STUDENT IN AN ORGANIZED HEALTH CARE EDUCATION/TRAINING PROGRAM

## 2025-05-06 RX ORDER — RIZATRIPTAN BENZOATE 10 MG/1
10 TABLET ORAL
Qty: 27 TABLET | Refills: 1 | Status: SHIPPED | OUTPATIENT
Start: 2025-05-06

## 2025-05-06 NOTE — PROGRESS NOTES
5/6/2025    TELEHEALTH EVALUATION -- Audio/Visual    Chief Complaint   Patient presents with    Follow-up    Sleep Apnea     Had sleep study done. Has appt 5/8 with Dr. Montenegro to review results.     Depression     Feels mood is stable.     Uterine Polyp     Had polyp removed 4/16/25.        HPI:  History of Present Illness  The patient is a 29-year-old female who presents via virtual visit for a follow-up appointment.    She reports a satisfactory recovery from her recent surgery performed by Dr. CONSTANTINO, although she continues to experience mild cramping and bleeding, which she was informed is normal. An intrauterine device (IUD) was inserted during the procedure. She has a scheduled follow-up appointment with Dr. Montenegro in 2 days.    She underwent diagnostic tests on 04/24/2025 and 04/25/2025, which revealed a diagnosis of severe hypersomnia. Her mood has improved following the surgery and sleep study. She is currently on Prozac 20 mg, which she believes is effective in managing her condition.    Her migraines have been less severe recently, and she manages them with Maxalt as needed. She has not been using Vistaril frequently but did take it a few weeks ago due to an anxiety attack. She also mentions feeling better since the removal of her Nexplanon implant. No other concerns at this time.     PAST SURGICAL HISTORY:  - Recent surgery with IUD insertion  - Removal of Nexplanon implant        PHYSICAL EXAMINATION:  Due to nature of virtual visit, unable to complete full physical exam at this time, patient does not sound short of breath while talking.  Does not appear to be in respiratory distress.    ASSESSMENT/PLAN:  Assessment & Plan  1. Post-surgical follow-up.  Doing well.   - Reports that the surgery went well, although she is still experiencing some cramping and bleeding, which Dr. CONSTANTINO indicated is normal.    2. Severe hypersomnia.  Continue to monitor.   - Diagnosed with severe hypersomnia based on recent test

## 2025-05-08 ENCOUNTER — OFFICE VISIT (OUTPATIENT)
Age: 30
End: 2025-05-08
Payer: COMMERCIAL

## 2025-05-08 VITALS
TEMPERATURE: 97.8 F | WEIGHT: 205 LBS | RESPIRATION RATE: 16 BRPM | DIASTOLIC BLOOD PRESSURE: 76 MMHG | BODY MASS INDEX: 34.16 KG/M2 | HEART RATE: 76 BPM | SYSTOLIC BLOOD PRESSURE: 120 MMHG | OXYGEN SATURATION: 98 % | HEIGHT: 65 IN

## 2025-05-08 DIAGNOSIS — E66.9 OBESITY (BMI 30-39.9): ICD-10-CM

## 2025-05-08 DIAGNOSIS — G47.10 HYPERSOMNIA: Primary | ICD-10-CM

## 2025-05-08 PROCEDURE — 99213 OFFICE O/P EST LOW 20 MIN: CPT | Performed by: INTERNAL MEDICINE

## 2025-05-08 RX ORDER — MODAFINIL 100 MG/1
100 TABLET ORAL DAILY
Qty: 30 TABLET | Refills: 0 | Status: SHIPPED | OUTPATIENT
Start: 2025-05-08 | End: 2025-06-07

## 2025-05-08 NOTE — PROGRESS NOTES
sounds normal. No hernia. No organomegaly.   Extremities: extremities normal, atraumatic, no cyanosis, no edema  Skin: Skin color, texture, turgor normal. No rashes or lesions   Neurological: No focal deficits,cranial nerves grossly intact. No weakness. Sensation normal   Psych: Normal Mood  Musculoskeletal: No joint abnormalities.                  Impression:   Diagnosis Orders   1. Hypersomnia  modafinil (PROVIGIL) 100 MG tablet      2. Obesity (BMI 30-39.9)                 Recommendations:     -  I discussed the results of sleep study and MSLT with her in details and answered all her questions  - She has severe hypersomnia.  Cannot rule out narcolepsy.  - Will start her on stimulants.  Will use modafinil.  Start with 100 mg daily.  Advised her regarding side effects.  - She was advised regarding potential decreased efficacy of birth control.  - No driving if sleepy or drowsy or otherwise impaired.     Return in about 4 weeks (around 6/5/2025).       Electronically signed by Ronda Montenegro MD on 5/8/2025 at 3:26 PM

## 2025-06-07 PROBLEM — G47.10 HYPERSOMNIA: Status: ACTIVE | Noted: 2025-06-07

## 2025-06-11 ENCOUNTER — OFFICE VISIT (OUTPATIENT)
Age: 30
End: 2025-06-11
Payer: COMMERCIAL

## 2025-06-11 VITALS
TEMPERATURE: 97.6 F | OXYGEN SATURATION: 98 % | BODY MASS INDEX: 35.16 KG/M2 | HEIGHT: 65 IN | WEIGHT: 211 LBS | DIASTOLIC BLOOD PRESSURE: 76 MMHG | RESPIRATION RATE: 16 BRPM | HEART RATE: 86 BPM | SYSTOLIC BLOOD PRESSURE: 128 MMHG

## 2025-06-11 DIAGNOSIS — G47.11 IDIOPATHIC HYPERSOMNIA: Primary | ICD-10-CM

## 2025-06-11 DIAGNOSIS — E66.9 OBESITY (BMI 30-39.9): ICD-10-CM

## 2025-06-11 PROCEDURE — 99213 OFFICE O/P EST LOW 20 MIN: CPT | Performed by: INTERNAL MEDICINE

## 2025-06-11 RX ORDER — MODAFINIL 200 MG/1
200 TABLET ORAL DAILY
Qty: 30 TABLET | Refills: 0 | Status: SHIPPED | OUTPATIENT
Start: 2025-06-11 | End: 2025-07-11

## 2025-06-11 NOTE — PROGRESS NOTES
pink  Throat: Clear,  [place] 3  Neck: Supple, No JVD. Nothyromegaly. Neck is thick  Lungs: Clear bilaterally. No wheezing. No crackles. No use of accessory muscles.   Heart: RRR, S1, S2 normal, no murmur, click, rub or gallop   Abdomen: soft, non-tender, nondistended. Bowel sounds normal. No hernia. No organomegaly.   Extremities: extremities normal, atraumatic, no cyanosis, no edema  Skin: Skin color, texture, turgor normal. No rashes or lesions   Neurological: No focal deficits,cranial nerves grossly intact. No weakness. Sensation normal   Psych: Normal Mood  Musculoskeletal: No joint abnormalities.                  Impression:   Diagnosis Orders   1. Idiopathic hypersomnia  modafinil (PROVIGIL) 200 MG tablet      2. Obesity (BMI 30-39.9)                   Recommendations:     - She is doing better with the Provigil at 100 mg daily.  However, continues to have hypersomnia.  Will increase the dose to 200 mg daily.  Reassess symptoms in few weeks.  Consider adding 100 mg in early afternoon if continues to have symptoms or adding a different agent.  -  She was advised regarding potential decreased efficacy of birth control.  -  No driving if sleepy or drowsy or otherwise impaired.       Return in about 3 weeks (around 7/2/2025).       Electronically signed by Ronda Montenegro MD on 6/11/2025 at 3:02 PM

## 2025-06-26 ENCOUNTER — TELEMEDICINE (OUTPATIENT)
Age: 30
End: 2025-06-26
Payer: COMMERCIAL

## 2025-06-26 DIAGNOSIS — G47.11 IDIOPATHIC HYPERSOMNIA: Primary | ICD-10-CM

## 2025-06-26 PROCEDURE — 99213 OFFICE O/P EST LOW 20 MIN: CPT | Performed by: INTERNAL MEDICINE

## 2025-06-26 NOTE — PROGRESS NOTES
Meaghan Barry, was evaluated through a synchronous (real-time) audio-video encounter. The patient (or guardian if applicable) is aware that this is a billable service, which includes applicable co-pays. This Virtual Visit was conducted with patient's (and/or legal guardian's) consent. Patient identification was verified, and a caregiver was present when appropriate.   The patient was located at Home: [address] [place] [id number]  Provider was located at Facility (Appt Dept): [address] [facility]  [address]  [place] [address]  Confirm you are appropriately licensed, registered, or certified to deliver care in the state where the patient is located as indicated above. If you are not or unsure, please re-schedule the visit: Yes, I confirm.     Meaghan Barry (:  1995) is a Established patient, presenting virtually for evaluation of the following:      Below is the assessment and plan developed based on review of pertinent history, physical exam, labs, studies, and medications.     Assessment & Plan  Idiopathic hypersomnia  Has been doing well with modafinil 200 mg daily.  Hypersomnia is better.  Able to function more at work and not needing to nap.  Continue for now.  Reassess symptoms in 3 months.  No driving if sleepy or drowsy or otherwise impaired.           No follow-ups on file.       Subjective follow-up  HPI    Has been doing well with modafinil 200 mg daily.  Tolerating medication well.  Denies any major side effects.  Wants to continue with it.        Review of Systems       12 point ROS has been obtained and negative except as mentioned in HPI.      Physical Exam     AAOx3  Appears well  Moves extremity spontaneously.    On this date 2025 I have spent 31 minutes reviewing previous notes, test results, and other pertinent medical information with the patient, discussing the diagnosis and importance of compliance with the treatment plan as well as documenting on the day of the

## 2025-08-14 ENCOUNTER — PATIENT MESSAGE (OUTPATIENT)
Age: 30
End: 2025-08-14

## (undated) DEVICE — SET FLD CTRL SYS INFLO AND OUTFLO TB AQUILEX

## (undated) DEVICE — GLOVE SURG SZ 65 L12IN FNGR THK79MIL GRN LTX FREE

## (undated) DEVICE — SET ENDOSCP SEAL HYSTEROSCOPE RIG OUTFLO CHN DISP MYOSURE

## (undated) DEVICE — LIQUIBAND RAPID ADHESIVE 36/CS 0.8ML: Brand: MEDLINE

## (undated) DEVICE — SPONGE,LAP,18"X18",DLX,XR,ST,5/PK,40/PK: Brand: MEDLINE

## (undated) DEVICE — MARKER SURG SKIN GENTIAN VLT REG TIP W/ 6IN RUL DYNJSM01

## (undated) DEVICE — SUTURE MONOCRYL SZ 4-0 L27IN ABSRB UD L19MM PS-2 1/2 CIR PRIM Y426H

## (undated) DEVICE — NEPTUNE E-SEP SMOKE EVACUATION PENCIL, COATED, 70MM BLADE, PUSH BUTTON SWITCH: Brand: NEPTUNE E-SEP

## (undated) DEVICE — JELLY,LUBE,STERILE,FLIP TOP,TUBE,2-OZ: Brand: MEDLINE

## (undated) DEVICE — DRAPE,UTILITY,TAPE,15X26,STERILE: Brand: MEDLINE

## (undated) DEVICE — LITHOTOMY: Brand: MEDLINE INDUSTRIES, INC.

## (undated) DEVICE — DEVICE TISS REM DIA3MM L25.25IN ENDOSCP F/ IU POLYPS

## (undated) DEVICE — ELECTRODE PT RET AD L9FT HI MOIST COND ADH HYDRGEL CORDED

## (undated) DEVICE — HAND: Brand: MEDLINE INDUSTRIES, INC.

## (undated) DEVICE — KIT CANSTR VAC TANTEM TB FOR AQUILEX FLD CTRL SYS